# Patient Record
Sex: FEMALE | Race: WHITE | NOT HISPANIC OR LATINO | Employment: OTHER | URBAN - METROPOLITAN AREA
[De-identification: names, ages, dates, MRNs, and addresses within clinical notes are randomized per-mention and may not be internally consistent; named-entity substitution may affect disease eponyms.]

---

## 2017-02-28 ENCOUNTER — OFFICE VISIT - HEALTHEAST (OUTPATIENT)
Dept: FAMILY MEDICINE | Facility: CLINIC | Age: 57
End: 2017-02-28

## 2017-02-28 ENCOUNTER — COMMUNICATION - HEALTHEAST (OUTPATIENT)
Dept: TELEHEALTH | Facility: CLINIC | Age: 57
End: 2017-02-28

## 2017-02-28 DIAGNOSIS — Z78.0 MENOPAUSE: ICD-10-CM

## 2017-02-28 DIAGNOSIS — Z13.228 SCREENING FOR ENDOCRINE, NUTRITIONAL, METABOLIC AND IMMUNITY DISORDER: ICD-10-CM

## 2017-02-28 DIAGNOSIS — Z00.00 PREVENTATIVE HEALTH CARE: ICD-10-CM

## 2017-02-28 DIAGNOSIS — Z13.0 SCREENING, ANEMIA, DEFICIENCY, IRON: ICD-10-CM

## 2017-02-28 DIAGNOSIS — Z13.29 SCREENING FOR THYROID DISORDER: ICD-10-CM

## 2017-02-28 DIAGNOSIS — Z12.11 SCREEN FOR COLON CANCER: ICD-10-CM

## 2017-02-28 DIAGNOSIS — Z13.0 SCREENING FOR ENDOCRINE, NUTRITIONAL, METABOLIC AND IMMUNITY DISORDER: ICD-10-CM

## 2017-02-28 DIAGNOSIS — Z13.29 SCREENING FOR ENDOCRINE, NUTRITIONAL, METABOLIC AND IMMUNITY DISORDER: ICD-10-CM

## 2017-02-28 DIAGNOSIS — Z12.4 SCREENING FOR MALIGNANT NEOPLASM OF CERVIX: ICD-10-CM

## 2017-02-28 DIAGNOSIS — Z13.220 SCREENING, LIPID: ICD-10-CM

## 2017-02-28 DIAGNOSIS — Z13.21 SCREENING FOR ENDOCRINE, NUTRITIONAL, METABOLIC AND IMMUNITY DISORDER: ICD-10-CM

## 2017-02-28 DIAGNOSIS — M81.0 OSTEOPOROSIS: ICD-10-CM

## 2017-02-28 DIAGNOSIS — Z00.00 ROUTINE GENERAL MEDICAL EXAMINATION AT A HEALTH CARE FACILITY: ICD-10-CM

## 2017-02-28 DIAGNOSIS — Z12.31 ENCOUNTER FOR SCREENING MAMMOGRAM FOR MALIGNANT NEOPLASM OF BREAST: ICD-10-CM

## 2017-02-28 DIAGNOSIS — E55.9 VITAMIN D DEFICIENCY DISEASE: ICD-10-CM

## 2017-02-28 DIAGNOSIS — Z13.228 SCREENING FOR METABOLIC DISORDER: ICD-10-CM

## 2017-02-28 DIAGNOSIS — Z13.1 SCREENING FOR DIABETES MELLITUS: ICD-10-CM

## 2017-02-28 ASSESSMENT — MIFFLIN-ST. JEOR: SCORE: 1205.24

## 2017-02-28 NOTE — ASSESSMENT & PLAN NOTE
Vitamin d level is low. It is 28.3 but I would like to see it closer to 60.  I recommend you start taking (or increase current intake of) over the counter vitamin D3, 2000 IU daily.  We can recheck your vitamin D level in 3 months or at your next visit.

## 2017-02-28 NOTE — ASSESSMENT & PLAN NOTE
New diagnosis 3/22/17  My Message was sent today asking her to come in to discuss this and make a plan.

## 2017-02-28 NOTE — ASSESSMENT & PLAN NOTE
Pap: normal 2010  Mammo: never done. Needed.   Colonoscopy: never done, due for screening. (addendum 4/3/17: this was done 3/2017 and a hyperplastic rectal polyp was removed - follow up recommended in 10 years).   Std testing desired:  offered  Osteoporosis prevention discussed.  vitamin d levels ordered. Recommend daily calcium and vitamin d intake to keep good bone health. Recommend weight bearing exercise, no tobacco, and limit alcohol  dexa -will order as she is in menopause.   Recommend sunscreen, exercise, & healthy diet.  Offered tsh, glucose, hgb, lipid  I have had an Advance Directives discussion with the patient.   Body mass index is 20.06 kg/(m^2).   mychart offered.

## 2017-03-03 ENCOUNTER — AMBULATORY - HEALTHEAST (OUTPATIENT)
Dept: LAB | Facility: CLINIC | Age: 57
End: 2017-03-03

## 2017-03-03 DIAGNOSIS — Z13.0 SCREENING FOR ENDOCRINE, NUTRITIONAL, METABOLIC AND IMMUNITY DISORDER: ICD-10-CM

## 2017-03-03 DIAGNOSIS — Z13.220 SCREENING, LIPID: ICD-10-CM

## 2017-03-03 DIAGNOSIS — Z13.29 SCREENING FOR THYROID DISORDER: ICD-10-CM

## 2017-03-03 DIAGNOSIS — Z13.1 SCREENING FOR DIABETES MELLITUS: ICD-10-CM

## 2017-03-03 DIAGNOSIS — Z13.228 SCREENING FOR ENDOCRINE, NUTRITIONAL, METABOLIC AND IMMUNITY DISORDER: ICD-10-CM

## 2017-03-03 DIAGNOSIS — Z13.21 SCREENING FOR ENDOCRINE, NUTRITIONAL, METABOLIC AND IMMUNITY DISORDER: ICD-10-CM

## 2017-03-03 DIAGNOSIS — Z13.0 SCREENING, ANEMIA, DEFICIENCY, IRON: ICD-10-CM

## 2017-03-03 DIAGNOSIS — Z13.228 SCREENING FOR METABOLIC DISORDER: ICD-10-CM

## 2017-03-03 DIAGNOSIS — Z13.29 SCREENING FOR ENDOCRINE, NUTRITIONAL, METABOLIC AND IMMUNITY DISORDER: ICD-10-CM

## 2017-03-03 LAB
CHOLEST SERPL-MCNC: 186 MG/DL
FASTING STATUS PATIENT QL REPORTED: YES
HDLC SERPL-MCNC: 83 MG/DL
LDLC SERPL CALC-MCNC: 92 MG/DL
TRIGL SERPL-MCNC: 55 MG/DL

## 2017-03-06 LAB
HPV INTERPRETATION - HISTORICAL: NORMAL
HPV INTERPRETER - HISTORICAL: NORMAL

## 2017-03-07 LAB
BKR LAB AP ABNORMAL BLEEDING: NO
BKR LAB AP BIRTH CONTROL/HORMONES: NORMAL
BKR LAB AP CERVICAL APPEARANCE: NORMAL
BKR LAB AP GYN ADEQUACY: NORMAL
BKR LAB AP GYN INTERPRETATION: NORMAL
BKR LAB AP HPV REFLEX: NORMAL
BKR LAB AP LMP: NORMAL
BKR LAB AP PATIENT STATUS: NORMAL
BKR LAB AP PREVIOUS ABNORMAL: NORMAL
BKR LAB AP PREVIOUS NORMAL: 2010
HIGH RISK?: NO
PATH REPORT.COMMENTS IMP SPEC: NORMAL
RESULT FLAG (HE HISTORICAL CONVERSION): NORMAL

## 2017-03-13 ENCOUNTER — RECORDS - HEALTHEAST (OUTPATIENT)
Dept: BONE DENSITY | Facility: CLINIC | Age: 57
End: 2017-03-13

## 2017-03-13 ENCOUNTER — RECORDS - HEALTHEAST (OUTPATIENT)
Dept: ADMINISTRATIVE | Facility: OTHER | Age: 57
End: 2017-03-13

## 2017-03-13 ENCOUNTER — HOSPITAL ENCOUNTER (OUTPATIENT)
Dept: MAMMOGRAPHY | Facility: HOSPITAL | Age: 57
Discharge: HOME | End: 2017-03-13
Attending: FAMILY MEDICINE

## 2017-03-13 DIAGNOSIS — Z12.31 ENCOUNTER FOR SCREENING MAMMOGRAM FOR MALIGNANT NEOPLASM OF BREAST: ICD-10-CM

## 2017-03-13 DIAGNOSIS — Z78.0 ASYMPTOMATIC MENOPAUSAL STATE: ICD-10-CM

## 2017-03-21 ENCOUNTER — RECORDS - HEALTHEAST (OUTPATIENT)
Dept: ADMINISTRATIVE | Facility: OTHER | Age: 57
End: 2017-03-21

## 2017-04-05 ENCOUNTER — OFFICE VISIT - HEALTHEAST (OUTPATIENT)
Dept: FAMILY MEDICINE | Facility: CLINIC | Age: 57
End: 2017-04-05

## 2017-04-05 DIAGNOSIS — E55.9 VITAMIN D DEFICIENCY DISEASE: ICD-10-CM

## 2017-04-05 DIAGNOSIS — M81.0 OSTEOPOROSIS: ICD-10-CM

## 2017-04-05 ASSESSMENT — MIFFLIN-ST. JEOR: SCORE: 1205.24

## 2017-04-05 NOTE — ASSESSMENT & PLAN NOTE
dexa 8/2015 shows osteoporosis.  Recommend STOP SMOKING, weight bearing exercise, alendronate 70mg po q week, vit D 2000 IU, calcium, weight bearing exercise, and repeat dexa in 2 years.

## 2018-02-27 ENCOUNTER — COMMUNICATION - HEALTHEAST (OUTPATIENT)
Dept: FAMILY MEDICINE | Facility: CLINIC | Age: 58
End: 2018-02-27

## 2018-02-27 DIAGNOSIS — M81.0 OSTEOPOROSIS: ICD-10-CM

## 2018-04-11 ENCOUNTER — AMBULATORY - HEALTHEAST (OUTPATIENT)
Dept: SCHEDULING | Facility: CLINIC | Age: 58
End: 2018-04-11

## 2018-04-11 ENCOUNTER — OFFICE VISIT - HEALTHEAST (OUTPATIENT)
Dept: FAMILY MEDICINE | Facility: CLINIC | Age: 58
End: 2018-04-11

## 2018-04-11 DIAGNOSIS — J30.1 HAYFEVER: ICD-10-CM

## 2018-04-11 DIAGNOSIS — Z13.0 SCREENING, ANEMIA, DEFICIENCY, IRON: ICD-10-CM

## 2018-04-11 DIAGNOSIS — M81.0 OSTEOPOROSIS: ICD-10-CM

## 2018-04-11 DIAGNOSIS — E55.9 VITAMIN D DEFICIENCY DISEASE: ICD-10-CM

## 2018-04-11 DIAGNOSIS — M67.472 GANGLION CYST OF LEFT FOOT: ICD-10-CM

## 2018-04-11 DIAGNOSIS — Z78.0 MENOPAUSE: ICD-10-CM

## 2018-04-11 DIAGNOSIS — Z13.228 ENCOUNTER FOR SCREENING FOR METABOLIC DISORDER: ICD-10-CM

## 2018-04-11 DIAGNOSIS — Z13.29 SCREENING FOR THYROID DISORDER: ICD-10-CM

## 2018-04-11 DIAGNOSIS — Z13.220 SCREENING, LIPID: ICD-10-CM

## 2018-04-11 DIAGNOSIS — Z00.00 PREVENTATIVE HEALTH CARE: ICD-10-CM

## 2018-04-11 LAB
ALBUMIN SERPL-MCNC: 4 G/DL (ref 3.5–5)
ALP SERPL-CCNC: 50 U/L (ref 45–120)
ALT SERPL W P-5'-P-CCNC: 18 U/L (ref 0–45)
ANION GAP SERPL CALCULATED.3IONS-SCNC: 9 MMOL/L (ref 5–18)
AST SERPL W P-5'-P-CCNC: 26 U/L (ref 0–40)
BASOPHILS # BLD AUTO: 0 THOU/UL (ref 0–0.2)
BASOPHILS NFR BLD AUTO: 1 % (ref 0–2)
BILIRUB SERPL-MCNC: 0.7 MG/DL (ref 0–1)
BUN SERPL-MCNC: 19 MG/DL (ref 8–22)
CALCIUM SERPL-MCNC: 10.2 MG/DL (ref 8.5–10.5)
CHLORIDE BLD-SCNC: 104 MMOL/L (ref 98–107)
CO2 SERPL-SCNC: 27 MMOL/L (ref 22–31)
CREAT SERPL-MCNC: 0.81 MG/DL (ref 0.6–1.1)
EOSINOPHIL # BLD AUTO: 0.3 THOU/UL (ref 0–0.4)
EOSINOPHIL NFR BLD AUTO: 4 % (ref 0–6)
ERYTHROCYTE [DISTWIDTH] IN BLOOD BY AUTOMATED COUNT: 10.4 % (ref 11–14.5)
GFR SERPL CREATININE-BSD FRML MDRD: >60 ML/MIN/1.73M2
GLUCOSE BLD-MCNC: 85 MG/DL (ref 70–125)
HCT VFR BLD AUTO: 41.3 % (ref 35–47)
HGB BLD-MCNC: 13.9 G/DL (ref 12–16)
LYMPHOCYTES # BLD AUTO: 1.4 THOU/UL (ref 0.8–4.4)
LYMPHOCYTES NFR BLD AUTO: 21 % (ref 20–40)
MCH RBC QN AUTO: 32.9 PG (ref 27–34)
MCHC RBC AUTO-ENTMCNC: 33.7 G/DL (ref 32–36)
MCV RBC AUTO: 98 FL (ref 80–100)
MONOCYTES # BLD AUTO: 0.5 THOU/UL (ref 0–0.9)
MONOCYTES NFR BLD AUTO: 8 % (ref 2–10)
NEUTROPHILS # BLD AUTO: 4.5 THOU/UL (ref 2–7.7)
NEUTROPHILS NFR BLD AUTO: 66 % (ref 50–70)
PLATELET # BLD AUTO: 230 THOU/UL (ref 140–440)
PMV BLD AUTO: 7.4 FL (ref 7–10)
POTASSIUM BLD-SCNC: 4.6 MMOL/L (ref 3.5–5)
PROT SERPL-MCNC: 7.4 G/DL (ref 6–8)
RBC # BLD AUTO: 4.23 MILL/UL (ref 3.8–5.4)
SODIUM SERPL-SCNC: 140 MMOL/L (ref 136–145)
TSH SERPL DL<=0.005 MIU/L-ACNC: 0.75 UIU/ML (ref 0.3–5)
WBC: 6.8 THOU/UL (ref 4–11)

## 2018-04-11 ASSESSMENT — MIFFLIN-ST. JEOR: SCORE: 1244.93

## 2018-04-11 NOTE — ASSESSMENT & PLAN NOTE
Taking 2000 - 3000 IU daily and dietary vit D, started over the past year after last blood draw. Vit D 3/2017  Recheck now.

## 2018-04-11 NOTE — ASSESSMENT & PLAN NOTE
Pap: normal 2/2017 - repeat - 2022.   Mammo: normal 3/2017. She is offered 1-2 year follow ups. She wants to wait until next year for next mammogram.  Colonoscopy:done 3/2017 and a hyperplastic rectal polyp was removed - follow up recommended in 10 years  Std testing desired:  offered   Recommend sunscreen, exercise, & healthy diet.  Offered tsh, glucose, hgb, lipid  I have had an Advance Directives discussion with the patient.   Body mass index is 20.06 kg/(m^2).   STAT-Diagnosticat active

## 2018-04-11 NOTE — ASSESSMENT & PLAN NOTE
Will repeat this year. Has been taking alendronate 70 mg po q week.  Will refill.  She tells me she does weightbearing exercise, she does not drink alcohol except for extremely rarely, she does not smoke, she gets dietary as well as supplemental vitamin D and she also is intentional about calcium intake.  In addition she has taken steps to stop drinking any carbonated beverages and avoids coffee.

## 2018-04-12 LAB
25(OH)D3 SERPL-MCNC: 61.6 NG/ML (ref 30–80)
25(OH)D3 SERPL-MCNC: 61.6 NG/ML (ref 30–80)

## 2018-04-17 ENCOUNTER — AMBULATORY - HEALTHEAST (OUTPATIENT)
Dept: LAB | Facility: CLINIC | Age: 58
End: 2018-04-17

## 2018-04-17 DIAGNOSIS — Z13.220 SCREENING, LIPID: ICD-10-CM

## 2018-04-17 LAB
CHOLEST SERPL-MCNC: 197 MG/DL
FASTING STATUS PATIENT QL REPORTED: YES
HDLC SERPL-MCNC: 84 MG/DL
LDLC SERPL CALC-MCNC: 103 MG/DL
TRIGL SERPL-MCNC: 49 MG/DL

## 2018-08-10 ENCOUNTER — COMMUNICATION - HEALTHEAST (OUTPATIENT)
Dept: FAMILY MEDICINE | Facility: CLINIC | Age: 58
End: 2018-08-10

## 2018-08-10 DIAGNOSIS — M81.0 OSTEOPOROSIS: ICD-10-CM

## 2018-11-02 ENCOUNTER — COMMUNICATION - HEALTHEAST (OUTPATIENT)
Dept: FAMILY MEDICINE | Facility: CLINIC | Age: 58
End: 2018-11-02

## 2018-11-02 DIAGNOSIS — M81.0 OSTEOPOROSIS: ICD-10-CM

## 2019-01-25 ENCOUNTER — COMMUNICATION - HEALTHEAST (OUTPATIENT)
Dept: FAMILY MEDICINE | Facility: CLINIC | Age: 59
End: 2019-01-25

## 2019-01-25 DIAGNOSIS — M81.0 OSTEOPOROSIS: ICD-10-CM

## 2019-01-29 ENCOUNTER — COMMUNICATION - HEALTHEAST (OUTPATIENT)
Dept: FAMILY MEDICINE | Facility: CLINIC | Age: 59
End: 2019-01-29

## 2019-01-29 DIAGNOSIS — M81.0 OSTEOPOROSIS: ICD-10-CM

## 2019-01-30 ENCOUNTER — COMMUNICATION - HEALTHEAST (OUTPATIENT)
Dept: FAMILY MEDICINE | Facility: CLINIC | Age: 59
End: 2019-01-30

## 2019-01-30 DIAGNOSIS — M81.0 OSTEOPOROSIS: ICD-10-CM

## 2020-12-08 ENCOUNTER — OFFICE VISIT - HEALTHEAST (OUTPATIENT)
Dept: FAMILY MEDICINE | Facility: CLINIC | Age: 60
End: 2020-12-08

## 2020-12-08 DIAGNOSIS — E55.9 VITAMIN D DEFICIENCY DISEASE: ICD-10-CM

## 2020-12-08 DIAGNOSIS — M67.472 GANGLION CYST OF LEFT FOOT: ICD-10-CM

## 2020-12-08 DIAGNOSIS — Z11.4 SCREENING FOR HIV WITHOUT PRESENCE OF RISK FACTORS: ICD-10-CM

## 2020-12-08 DIAGNOSIS — Z13.220 SCREENING, LIPID: ICD-10-CM

## 2020-12-08 DIAGNOSIS — Z00.00 PREVENTATIVE HEALTH CARE: ICD-10-CM

## 2020-12-08 DIAGNOSIS — Z13.0 SCREENING, ANEMIA, DEFICIENCY, IRON: ICD-10-CM

## 2020-12-08 DIAGNOSIS — Z13.228 SCREENING FOR METABOLIC DISORDER: ICD-10-CM

## 2020-12-08 DIAGNOSIS — J30.1 HAYFEVER: ICD-10-CM

## 2020-12-08 DIAGNOSIS — M81.0 AGE RELATED OSTEOPOROSIS, UNSPECIFIED PATHOLOGICAL FRACTURE PRESENCE: ICD-10-CM

## 2020-12-08 DIAGNOSIS — Z11.59 ENCOUNTER FOR HEPATITIS C SCREENING TEST FOR LOW RISK PATIENT: ICD-10-CM

## 2020-12-08 DIAGNOSIS — Z23 IMMUNIZATION DUE: ICD-10-CM

## 2020-12-08 DIAGNOSIS — Z12.31 ENCOUNTER FOR SCREENING MAMMOGRAM FOR MALIGNANT NEOPLASM OF BREAST: ICD-10-CM

## 2020-12-08 LAB
ALBUMIN SERPL-MCNC: 4.5 G/DL (ref 3.5–5)
ALP SERPL-CCNC: 64 U/L (ref 45–120)
ALT SERPL W P-5'-P-CCNC: 18 U/L (ref 0–45)
ANION GAP SERPL CALCULATED.3IONS-SCNC: 10 MMOL/L (ref 5–18)
AST SERPL W P-5'-P-CCNC: 27 U/L (ref 0–40)
BASOPHILS # BLD AUTO: 0 THOU/UL (ref 0–0.2)
BASOPHILS NFR BLD AUTO: 0 % (ref 0–2)
BILIRUB SERPL-MCNC: 0.5 MG/DL (ref 0–1)
BUN SERPL-MCNC: 16 MG/DL (ref 8–22)
CALCIUM SERPL-MCNC: 9.8 MG/DL (ref 8.5–10.5)
CHLORIDE BLD-SCNC: 102 MMOL/L (ref 98–107)
CO2 SERPL-SCNC: 28 MMOL/L (ref 22–31)
CREAT SERPL-MCNC: 0.81 MG/DL (ref 0.6–1.1)
EOSINOPHIL # BLD AUTO: 0.3 THOU/UL (ref 0–0.4)
EOSINOPHIL NFR BLD AUTO: 5 % (ref 0–6)
ERYTHROCYTE [DISTWIDTH] IN BLOOD BY AUTOMATED COUNT: 10.5 % (ref 11–14.5)
GFR SERPL CREATININE-BSD FRML MDRD: >60 ML/MIN/1.73M2
GLUCOSE BLD-MCNC: 95 MG/DL (ref 70–125)
HCT VFR BLD AUTO: 41 % (ref 35–47)
HGB BLD-MCNC: 14.1 G/DL (ref 12–16)
LYMPHOCYTES # BLD AUTO: 1.5 THOU/UL (ref 0.8–4.4)
LYMPHOCYTES NFR BLD AUTO: 20 % (ref 20–40)
MCH RBC QN AUTO: 33.7 PG (ref 27–34)
MCHC RBC AUTO-ENTMCNC: 34.5 G/DL (ref 32–36)
MCV RBC AUTO: 98 FL (ref 80–100)
MONOCYTES # BLD AUTO: 0.5 THOU/UL (ref 0–0.9)
MONOCYTES NFR BLD AUTO: 7 % (ref 2–10)
NEUTROPHILS # BLD AUTO: 5 THOU/UL (ref 2–7.7)
NEUTROPHILS NFR BLD AUTO: 68 % (ref 50–70)
PLATELET # BLD AUTO: 237 THOU/UL (ref 140–440)
PMV BLD AUTO: 7.7 FL (ref 7–10)
POTASSIUM BLD-SCNC: 4.2 MMOL/L (ref 3.5–5)
PROT SERPL-MCNC: 7.5 G/DL (ref 6–8)
RBC # BLD AUTO: 4.2 MILL/UL (ref 3.8–5.4)
SODIUM SERPL-SCNC: 140 MMOL/L (ref 136–145)
WBC: 7.4 THOU/UL (ref 4–11)

## 2020-12-08 ASSESSMENT — MIFFLIN-ST. JEOR: SCORE: 1218.38

## 2020-12-08 NOTE — ASSESSMENT & PLAN NOTE
Flu shot done.   Pap: normal 2/2017 due in 5 years   Mammo: normal 2/2017, repeat ordered.   Colonoscopy:  Colonoscopy 3/2017 plan repeat 10 years.   Std testing desired: declined.  offered  Osteoporosis prevention discussed.  vitamin d levels ordered. Recommend daily calcium and vitamin d intake to keep good bone health. Recommend weight bearing exercise, no tobacco, and limit alcohol  dexa - see osteoporosis in problem list.   Recommend sunscreen, exercise, & healthy diet.  Offered cbc, cmp, lipids and asked what other testing she  desires today  I have had an Advance Directives discussion with the patient. - she does have one, and is asked to get us a copy.   Body mass index is 20.68 kg/m .   mychart active.

## 2020-12-08 NOTE — ASSESSMENT & PLAN NOTE
She says she got sick to the stomach from fosamax so 'I just stopped taking it'.     We discussed prolia today and she declined that.

## 2020-12-08 NOTE — ASSESSMENT & PLAN NOTE
Vitamin D, Total (25-Hydroxy)   Date Value Ref Range Status   04/11/2018 61.6 30.0 - 80.0 ng/mL Final      Will recheck.

## 2020-12-09 ENCOUNTER — AMBULATORY - HEALTHEAST (OUTPATIENT)
Dept: LAB | Facility: CLINIC | Age: 60
End: 2020-12-09

## 2020-12-09 DIAGNOSIS — Z13.220 SCREENING, LIPID: ICD-10-CM

## 2020-12-09 LAB
25(OH)D3 SERPL-MCNC: 59.6 NG/ML (ref 30–80)
CHOLEST SERPL-MCNC: 197 MG/DL
FASTING STATUS PATIENT QL REPORTED: YES
HDLC SERPL-MCNC: 74 MG/DL
LDLC SERPL CALC-MCNC: 110 MG/DL
TRIGL SERPL-MCNC: 66 MG/DL

## 2021-01-04 ENCOUNTER — HOSPITAL ENCOUNTER (OUTPATIENT)
Dept: MAMMOGRAPHY | Facility: CLINIC | Age: 61
Discharge: HOME | End: 2021-01-04
Attending: FAMILY MEDICINE

## 2021-01-04 DIAGNOSIS — Z12.31 ENCOUNTER FOR SCREENING MAMMOGRAM FOR MALIGNANT NEOPLASM OF BREAST: ICD-10-CM

## 2021-05-30 VITALS — WEIGHT: 130 LBS | BODY MASS INDEX: 19.7 KG/M2 | HEIGHT: 68 IN

## 2021-06-01 VITALS — WEIGHT: 137 LBS | BODY MASS INDEX: 20.76 KG/M2 | HEIGHT: 68 IN

## 2021-06-05 VITALS
DIASTOLIC BLOOD PRESSURE: 64 MMHG | HEIGHT: 68 IN | OXYGEN SATURATION: 98 % | BODY MASS INDEX: 20.31 KG/M2 | HEART RATE: 75 BPM | WEIGHT: 134 LBS | SYSTOLIC BLOOD PRESSURE: 112 MMHG

## 2021-06-09 NOTE — PROGRESS NOTES
ASSESSMENT AND PLAN:  Problem List Items Addressed This Visit     Vitamin D deficiency disease     She is actively taking vit D 2000 iu daily.         Osteoporosis - Primary     dexa 2015 shows osteoporosis.  Recommend STOP SMOKING, weight bearing exercise, alendronate 70mg po q week, vit D 2000 IU, calcium, weight bearing exercise, and repeat dexa in 2 years.         Relevant Medications    alendronate (FOSAMAX) 70 MG tablet           Chief Complaint   Patient presents with     Results     DEXA scan results - wants to discuss options for treatment.       HPI  Angela Dailey is a 56 y.o. female comes in today to discuss a new diagnosis of osteoporosis.  She tells me that her mother had osteoporosis and that her brought mother actually broke both of her hips in her 80s.  Her mother was able to rehabilitate and walk with a cane.  Her mother ended up living independently until she was 93 and  in a car accident.    Still, Angela  remembers what it was like to see her mother deal with the pain from osteoporosis and is not at all interested in dealing with it herself.  She is extremely motivated and tells me that she walks daily and does weight lifting 3 times a week for 10-20 minutes.  She has been taking vitamin D about 2000 international units a day including dietary sources since she had her labs drawn and saw that she was vitamin D deficient.      She drinks alcohol about 3 times a week and does not smoke at all.    History   Smoking Status     Never Smoker   Smokeless Tobacco     Never Used      No current outpatient prescriptions on file.     No current facility-administered medications for this visit.      No Known Allergies    Review of Systems   Constitutional: Negative.    HENT: Negative.    Eyes: Negative.    Respiratory: Negative.    Cardiovascular: Negative.    Gastrointestinal: Negative.    Endocrine: Negative.    Genitourinary: Negative.    Musculoskeletal: Negative.    Skin: Negative.   "  Neurological: Negative.    Hematological: Negative.    Psychiatric/Behavioral: Negative.        OBJECTIVE: BP 92/60  Pulse 76  Resp 14  Ht 5' 7.5\" (1.715 m)  Wt 130 lb (59 kg)  LMP  (LMP Unknown)  Breastfeeding? No  BMI 20.06 kg/m2  Physical Exam   Constitutional: She is oriented to person, place, and time. She appears well-developed and well-nourished.   HENT:   Head: Normocephalic and atraumatic.   Eyes: Conjunctivae are normal.   Cardiovascular: Normal rate and regular rhythm.    Pulmonary/Chest: Effort normal.   Neurological: She is alert and oriented to person, place, and time.   Skin: Skin is warm and dry.   Psychiatric: She has a normal mood and affect.      "

## 2021-06-09 NOTE — PROGRESS NOTES
Problem List Items Addressed This Visit     Preventative health care     Pap: normal 2010  Mammo: never done. Needed.   Colonoscopy: never done, due for screening.    Std testing desired:  offered  Osteoporosis prevention discussed.  vitamin d levels ordered. Recommend daily calcium and vitamin d intake to keep good bone health. Recommend weight bearing exercise, no tobacco, and limit alcohol  dexa - will order as she is in menopause.   Recommend sunscreen, exercise, & healthy diet.  Offered tsh, glucose, hgb, lipid  I have had an Advance Directives discussion with the patient.   Body mass index is 20.06 kg/(m^2).   mychart offered.           Menopause     dexa ordered.          Relevant Orders    DXA Bone Density Scan    Vitamin D deficiency disease     Vitamin d level is low. It is 28.3 but I would like to see it closer to 60.  I recommend you start taking (or increase current intake of) over the counter vitamin D3, 2000 IU daily.  We can recheck your vitamin D level in 3 months or at your next visit.                 Other Visit Diagnoses     Routine general medical examination at a health care facility    -  Primary    Screening, lipid        Relevant Orders    Lipid Cascade (Completed)    Screening for diabetes mellitus        Relevant Orders    Comprehensive Metabolic Panel (Completed)    Screening for metabolic disorder        Relevant Orders    Comprehensive Metabolic Panel (Completed)    Screening for thyroid disorder        Relevant Orders    Thyroid Cascade (Completed)    Screening, anemia, deficiency, iron        Relevant Orders    HM1(CBC and Differential) (Completed)    Screening for endocrine, nutritional, metabolic and immunity disorder        Relevant Orders    Vitamin D, Total (25-Hydroxy) (Completed)    Screen for colon cancer        Relevant Orders    Ambulatory referral for Colonoscopy    Encounter for screening mammogram for malignant neoplasm of breast        Relevant Orders    Mammo Screening  Bilateral    Screening for malignant neoplasm of cervix        Relevant Orders    Gynecologic Cytology (PAP Smear) (Completed)    HPV Cascade (PCR) (Completed)

## 2021-06-16 PROBLEM — K62.1 RECTAL POLYP: Status: ACTIVE | Noted: 2017-03-21

## 2021-06-23 NOTE — TELEPHONE ENCOUNTER
Refill Approved    Rx renewed per Medication Renewal Policy. Medication was last renewed on 11/2/18.    Kimberly Young, [facility] Triage/Med Refill 1/27/2019     Requested Prescriptions   Pending Prescriptions Disp Refills     alendronate (FOSAMAX) 70 MG tablet [Pharmacy Med Name: ALENDRONATE SODIUM 70 MG TAB] 12 tablet 0     Sig: TAKE 1 TAB BY MOUTH EVERY 7 DAYS. TAKE IN THE AM WITH WATER ON EMPTY STOMACH, 30 MIN BEFORE FOOD    Biphosphonates Refill Protocol Passed - 1/25/2019  1:41 AM       Passed - PCP or prescribing provider visit in last 12 months    Last office visit with prescriber/PCP: 4/5/2017 Brooklyn Ryder MD OR same dept: Visit date not found OR same specialty: 4/5/2017 Brooklyn Ryder MD  Last physical: 4/11/2018 Last MTM visit: Visit date not found   Next visit within 3 mo: Visit date not found  Next physical within 3 mo: Visit date not found  Prescriber OR PCP: Brooklyn Ryder MD  Last diagnosis associated with med order: 1. Osteoporosis  - alendronate (FOSAMAX) 70 MG tablet [Pharmacy Med Name: ALENDRONATE SODIUM 70 MG TAB]; TAKE 1 TAB BY MOUTH EVERY 7 DAYS, TAKE IN THE AM WITH WATER ON EMPTY STOMACH, 30 MIN BEFORE FOOD  Dispense: 12 tablet; Refill: 0    If protocol passes may refill for 12 months if within 3 months of last provider visit (or a total of 15 months).            Passed - Serum creatinine in last 12 months    Creatinine   Date Value Ref Range Status   04/11/2018 0.81 0.60 - 1.10 mg/dL Final

## 2021-06-23 NOTE — TELEPHONE ENCOUNTER
Refill Approved    Rx renewed per Medication Renewal Policy. Medication was last renewed on 1/27/19.    Ov: 4/11/18    Yamilex Ellis, [facility] Triage/Med Refill 1/31/2019     Requested Prescriptions   Pending Prescriptions Disp Refills     alendronate (FOSAMAX) 70 MG tablet [Pharmacy Med Name: ALENDRONATE SODIUM 70 MG TAB] 12 tablet 0     Sig: TAKE 1 TAB BY MOUTH EVERY 7 DAYS. TAKE IN THE AM WITH WATER ON EMPTY STOMACH, 30 MIN BEFORE FOOD    Biphosphonates Refill Protocol Passed - 1/30/2019  6:43 PM       Passed - PCP or prescribing provider visit in last 12 months    Last office visit with prescriber/PCP: 4/5/2017 Brooklyn Ryder MD OR same dept: Visit date not found OR same specialty: 4/5/2017 Brooklyn Ryder MD  Last physical: 4/11/2018 Last MTM visit: Visit date not found   Next visit within 3 mo: Visit date not found  Next physical within 3 mo: Visit date not found  Prescriber OR PCP: Brooklyn Ryder MD  Last diagnosis associated with med order: 1. Osteoporosis  - alendronate (FOSAMAX) 70 MG tablet [Pharmacy Med Name: ALENDRONATE SODIUM 70 MG TAB]; TAKE 1 TAB BY MOUTH EVERY 7 DAYS. TAKE IN THE AM WITH WATER ON EMPTY STOMACH, 30 MIN BEFORE FOOD  Dispense: 12 tablet; Refill: 0    If protocol passes may refill for 12 months if within 3 months of last provider visit (or a total of 15 months).            Passed - Serum creatinine in last 12 months    Creatinine   Date Value Ref Range Status   04/11/2018 0.81 0.60 - 1.10 mg/dL Final

## 2021-06-25 NOTE — PROGRESS NOTES
Progress Notes by Brooklyn Ryder MD at 2/28/2017  3:20 PM     Author: Brooklyn Ryder MD Service: -- Author Type: Physician    Filed: 2/28/2017  6:11 PM Encounter Date: 2/28/2017 Status: Signed    : Brooklyn Ryder MD (Physician)       Assessment:      Healthy female exam.      Plan:     Problem List Items Addressed This Visit     Preventative health care     Pap: normal 2010  Mammo: never done. Needed.   Colonoscopy: never done, due for screening.    Std testing desired:  offered  Osteoporosis prevention discussed.  vitamin d levels ordered. Recommend daily calcium and vitamin d intake to keep good bone health. Recommend weight bearing exercise, no tobacco, and limit alcohol  dexa - will order as she is in menopause.   Recommend sunscreen, exercise, & healthy diet.  Offered tsh, glucose, hgb, lipid  I have had an Advance Directives discussion with the patient.   Body mass index is 20.06 kg/(m^2).   mychart offered.           Menopause     dexa ordered.          Relevant Orders    DXA Bone Density Scan      Other Visit Diagnoses     Routine general medical examination at a health care facility    -  Primary    Screening, lipid        Relevant Orders    Lipid Cascade    Screening for diabetes mellitus        Relevant Orders    Comprehensive Metabolic Panel    Screening for metabolic disorder        Relevant Orders    Comprehensive Metabolic Panel    Screening for thyroid disorder        Relevant Orders    Thyroid Cascade    Screening, anemia, deficiency, iron        Relevant Orders    HM1(CBC and Differential)    Screening for endocrine, nutritional, metabolic and immunity disorder        Relevant Orders    Vitamin D, Total (25-Hydroxy)    Screen for colon cancer        Relevant Orders    Ambulatory referral for Colonoscopy    Encounter for screening mammogram for malignant neoplasm of breast        Relevant Orders    Mammo Screening Bilateral           I have had an Advance Directives discussion  with the patient.      Subjective:      Angela Dailey is a 56 y.o. female who presents for an annual exam. The patient is sexually active. The patient participates in regular exercise: yes. The patient reports that there is not domestic violence in her life.     Healthy Habits:   Regular Exercise: Yes  Sunscreen Use: Yes  Healthy Diet: Yes  Dental Visits Regularly: Yes  Seat Belt: Yes  Sexually active: Yes  Self Breast Exam Monthly:Yes  Hemoccults: No  Flex Sig: No  Colonoscopy: No  Lipid Profile: Yes  Glucose Screen: Yes  Prevention of Osteoporosis: Yes  Last Dexa: No  Guns at Home:  No        There is no immunization history on file for this patient.  Immunization status: Unknown.    No exam data present    Gynecologic History  No LMP recorded (lmp unknown). Patient is postmenopausal.  Contraception: menopause.   Last Pap: . Results were: normal  Last mammogram: None. Results were: None      OB History    Para Term  AB SAB TAB Ectopic Multiple Living   4 2 2  2 2          # Outcome Date GA Lbr Rohit/2nd Weight Sex Delivery Anes PTL Lv   4 Term 99    F       3 Term 95    F       2 SAB            1 SAB                   No current outpatient prescriptions on file.     No current facility-administered medications for this visit.      No past medical history on file.  No past surgical history on file.  Review of patient's allergies indicates no known allergies.  No family history on file.  Social History     Social History   ? Marital status:      Spouse name: N/A   ? Number of children: N/A   ? Years of education: N/A     Occupational History   ? Not on file.     Social History Main Topics   ? Smoking status: Never Smoker   ? Smokeless tobacco: Never Used   ? Alcohol use Yes   ? Drug use: No   ? Sexual activity: Yes     Partners: Male     Birth control/ protection: Post-menopausal     Other Topics Concern   ? Not on file     Social History Narrative   ? No narrative on file  "      Review of Systems   Review of Systems   Constitutional: Negative.    HENT: Negative.    Eyes: Negative.    Respiratory: Negative.    Cardiovascular: Negative.    Gastrointestinal: Negative.    Endocrine: Negative.    Genitourinary: Negative.    Musculoskeletal: Negative.    Skin: Negative.    Neurological: Negative.    Hematological: Negative.    Psychiatric/Behavioral: Negative.              Objective:         Vitals:    02/28/17 1526   BP: 108/72   Pulse: 80   Resp: 16   Weight: 130 lb (59 kg)   Height: 5' 7.5\" (1.715 m)     Body mass index is 20.06 kg/(m^2).    Physical   Physical Exam   Constitutional: She is oriented to person, place, and time. She appears well-nourished.   HENT:   Head: Normocephalic.   Eyes: Conjunctivae and EOM are normal. Pupils are equal, round, and reactive to light.   Neck: Normal range of motion. Neck supple.   Cardiovascular: Normal rate, regular rhythm, normal heart sounds and intact distal pulses.    Pulmonary/Chest: Effort normal and breath sounds normal. Right breast exhibits no inverted nipple, no mass, no nipple discharge, no skin change and no tenderness. Left breast exhibits no inverted nipple, no mass, no nipple discharge, no skin change and no tenderness. Breasts are symmetrical.   Abdominal: Soft. Bowel sounds are normal.   Genitourinary: Rectal exam shows anal tone normal. No labial fusion. There is no rash, tenderness, lesion or injury on the right labia. There is no rash, tenderness, lesion or injury on the left labia. Uterus is not deviated, not enlarged, not fixed and not tender. Cervix exhibits no motion tenderness, no discharge and no friability. Right adnexum displays no mass, no tenderness and no fullness. Left adnexum displays no mass, no tenderness and no fullness. No erythema, tenderness or bleeding in the vagina. No foreign body in the vagina. No signs of injury around the vagina. No vaginal discharge found.   Musculoskeletal: Normal range of motion. "   Neurological: She is alert and oriented to person, place, and time.   Skin: Skin is warm and dry.        A 3 mm brown well-demarcated macule is noted just above the nipple on the right.  She says this has been unchanged for many years.   Psychiatric: She has a normal mood and affect. Her behavior is normal. Judgment and thought content normal.   Nursing note and vitals reviewed.

## 2021-06-26 NOTE — PROGRESS NOTES
Progress Notes by Brooklyn Ryder MD at 4/11/2018  1:00 PM     Author: Brooklyn Ryder MD Service: -- Author Type: Physician    Filed: 4/11/2018  1:49 PM Encounter Date: 4/11/2018 Status: Signed    : Brooklyn Ryder MD (Physician)       Assessment:      Healthy female exam.      Plan:      Problem List Items Addressed This Visit     Preventative health care     Pap: normal 2/2017 - repeat - 2022.   Mammo: normal 3/2017. She is offered 1-2 year follow ups. She wants to wait until next year for next mammogram.  Colonoscopy:done 3/2017 and a hyperplastic rectal polyp was removed - follow up recommended in 10 years  Std testing desired:  offered   Recommend sunscreen, exercise, & healthy diet.  Offered tsh, glucose, hgb, lipid  I have had an Advance Directives discussion with the patient.   Body mass index is 20.06 kg/(m^2).   mychart active          Menopause     Asymptomatic.         Vitamin D deficiency disease - Primary     Taking 2000 - 3000 IU daily and dietary vit D, started over the past year after last blood draw. Vit D 3/2017  Recheck now.          Relevant Orders    Vitamin D, Total (25-Hydroxy)    Osteoporosis     Will repeat this year. Has been taking alendronate 70 mg po q week.  Will refill.  She tells me she does weightbearing exercise, she does not drink alcohol except for extremely rarely, she does not smoke, she gets dietary as well as supplemental vitamin D and she also is intentional about calcium intake.  In addition she has taken steps to stop drinking any carbonated beverages and avoids coffee.         Relevant Medications    alendronate (FOSAMAX) 70 MG tablet    Other Relevant Orders    Vitamin D, Total (25-Hydroxy)    DXA Bone Density Scan    Hayfever     Uses Claritin imminently for fall allergies to weeds         Ganglion cyst of left foot     Asymptomatic, incidental finding on today's exam.  At this point no intervention since it is not bothersome to her.  She will return  to clinic if at any point it is bothering her.           Other Visit Diagnoses     Encounter for screening for metabolic disorder        Relevant Orders    Comprehensive Metabolic Panel    Screening, anemia, deficiency, iron        Relevant Orders    HM1(CBC and Differential)    HM1 (CBC with Diff)    Screening, lipid        Relevant Orders    Lipid [place]    Screening for thyroid disorder        Relevant Orders    Thyroid [place]         I have had an Advance Directives discussion with the patient.     Subjective:      Angela Dailey is a 57 y.o. female who presents for an annual exam. The patient is sexually active. The patient participates in regular exercise: yes. The patient reports that there is not domestic violence in her life.     Healthy Habits:   Regular Exercise: Yes  Sunscreen Use: Yes  Healthy Diet: Yes  Dental Visits Regularly: Yes  Seat Belt: Yes  Sexually active: Yes  Self Breast Exam Monthly:Yes  Hemoccults: No  Flex Sig: No  Colonoscopy: Yes  Lipid Profile: Yes  Glucose Screen: Yes  Prevention of Osteoporosis: Yes  Last Dexa: Yes  Guns at Home:  No      There is no immunization history on file for this patient.  Immunization status: unknown status.    No exam data present    Gynecologic History  No LMP recorded (lmp unknown). Patient is postmenopausal.  Contraception: post menopausal status  Last Pap: 2017. Results were: normal  Last mammogram: 3/13/2017. Results were: normal      OB History    Para Term  AB Living   4 2 2  2 2   SAB TAB Ectopic Multiple Live Births   2          # Outcome Date GA Lbr Rohit/2nd Weight Sex Delivery Anes PTL Lv   4 Term 99    F       3 Term 95    F       2 SAB            1 SAB                   Current Outpatient Prescriptions   Medication Sig Dispense Refill   ? alendronate (FOSAMAX) 70 MG tablet TAKE 1 TAB BY MOUTH EVERY 7 DAYS, TAKE IN THE AM WITH WATER ON EMPTY STOMACH, 30 MIN BEFORE FOOD 12 tablet 0   ? cholecalciferol, vitamin D3,  "(VITAMIN D3) 2,000 unit Tab Take 2000 IU daily  0     No current facility-administered medications for this visit.      No past medical history on file.  No past surgical history on file.  Review of patient's allergies indicates no known allergies.  No family history on file.  Social History     Social History   ? Marital status:      Spouse name: N/A   ? Number of children: N/A   ? Years of education: N/A     Occupational History   ? Not on file.     Social History Main Topics   ? Smoking status: Never Smoker   ? Smokeless tobacco: Never Used   ? Alcohol use Yes      Comment: Rarely   ? Drug use: No   ? Sexual activity: Yes     Partners: Male     Birth control/ protection: Post-menopausal     Other Topics Concern   ? Not on file     Social History Narrative       Review of Systems   Review of Systems   Constitutional: Negative.    HENT: Negative.    Eyes: Negative.    Respiratory: Negative.    Cardiovascular: Negative.    Gastrointestinal: Negative.    Endocrine: Negative.    Genitourinary: Negative.    Musculoskeletal: Negative.    Skin: Negative.    Neurological: Negative.    Hematological: Negative.    Psychiatric/Behavioral: Negative.              Objective:         Vitals:    04/11/18 1302   BP: 104/62   Pulse: 72   Resp: 14   Weight: 137 lb (62.1 kg)   Height: 5' 8\" (1.727 m)     Body mass index is 20.83 kg/(m^2).    Physical   Physical Exam   Constitutional: She is oriented to person, place, and time. She appears well-developed and well-nourished.   HENT:   Head: Normocephalic.   Right Ear: External ear normal.   Left Ear: External ear normal.   Nose: Nose normal.   Mouth/Throat: Oropharynx is clear and moist.   Eyes: Conjunctivae and EOM are normal.   Neck: Normal range of motion. Neck supple.   Cardiovascular: Normal rate, regular rhythm and normal heart sounds.    Pulmonary/Chest: Effort normal and breath sounds normal. Right breast exhibits no inverted nipple, no mass, no nipple discharge, no skin " change and no tenderness. Left breast exhibits no inverted nipple, no mass, no nipple discharge, no skin change and no tenderness. Breasts are symmetrical.   Abdominal: Soft. Bowel sounds are normal.   Genitourinary:   Genitourinary Comments: Declined   Musculoskeletal: Normal range of motion.        Feet:    Neurological: She is alert and oriented to person, place, and time.   Skin: Skin is warm and dry.   No concerning or changing moles are noted by patient or myself.   Psychiatric: She has a normal mood and affect. Her behavior is normal. Judgment and thought content normal.                show

## 2021-06-27 ENCOUNTER — HEALTH MAINTENANCE LETTER (OUTPATIENT)
Age: 61
End: 2021-06-27

## 2021-06-30 NOTE — PROGRESS NOTES
Progress Notes by Brooklyn Ryder MD at 12/8/2020  1:40 PM     Author: Brooklyn Ryder MD Service: -- Author Type: Physician    Filed: 12/8/2020  2:29 PM Encounter Date: 12/8/2020 Status: Signed    : Brooklyn Ryder MD (Physician)       FEMALE PREVENTATIVE EXAM    Assessment and Plan:   Patient has been advised of split billing requirements and indicates understanding: yes    Problem List Items Addressed This Visit        Unprioritized    Preventative health care     Flu shot done.   Pap: normal 2/2017 due in 5 years   Mammo: normal 2/2017, repeat ordered.   Colonoscopy:  Colonoscopy 3/2017 plan repeat 10 years.   Std testing desired: declined.  offered  Osteoporosis prevention discussed.  vitamin d levels ordered. Recommend daily calcium and vitamin d intake to keep good bone health. Recommend weight bearing exercise, no tobacco, and limit alcohol  dexa - see osteoporosis in problem list.   Recommend sunscreen, exercise, & healthy diet.  Offered cbc, cmp, lipids and asked what other testing she  desires today  I have had an Advance Directives discussion with the patient. - she does have one, and is asked to get us a copy.   Body mass index is 20.68 kg/m .   mychart active.          Vitamin D deficiency disease - Primary     Vitamin D, Total (25-Hydroxy)   Date Value Ref Range Status   04/11/2018 61.6 30.0 - 80.0 ng/mL Final      Will recheck.          Relevant Orders    Vitamin D, Total (25-Hydroxy)    Osteoporosis     She says she got sick to the stomach from fosamax so 'I just stopped taking it'.     We discussed prolia today and she declined that.          Hayfever     Not currently an issue.          RESOLVED: Ganglion cyst of left foot     She states it resolved.            Other Visit Diagnoses     Screening, lipid        Relevant Orders    Lipid [place] FASTING    Screening for metabolic disorder        Relevant Orders    Comprehensive Metabolic Panel    Screening, anemia, deficiency, iron         Relevant Orders    HM1(CBC and Differential)    Encounter for screening mammogram for malignant neoplasm of breast        Relevant Orders    Mammo Screening Bilateral    Encounter for hepatitis C screening test for low risk patient        Screening for HIV without presence of risk factors        Immunization due        Relevant Orders    Tdap vaccine,  6yo or older,  IM (Completed)            Next follow up:  No follow-ups on file.    Immunization Review  Adult Imm Review: Missing doses of tdap  Due today, orders placed  Social History     Tobacco Use   Smoking Status Never Smoker   Smokeless Tobacco Never Used      I discussed the following with the patient:   Adult Healthy Living: Importance of regular exercise  Healthy nutrition    I have had an Advance Directives discussion with the patient.    Subjective:   Chief Complaint: Angela Dailey is an 60 y.o. female here for a preventative health visit.    Patient has been advised of split billing requirements and indicates understanding: No  HPI:  Comes in for annual exam.     Healthy Habits  Are you taking a daily aspirin? No  Do you typically exercising at least 40 min, 3-4 times per week?  Yes  Do you usually eat at least 4 servings of fruit and vegetables a day, include whole grains and fiber and avoid regularly eating high fat foods? Yes  Have you had an eye exam in the past two years? Yes  Do you see a dentist twice per year? Yes  Do you have any concerns regarding sleep? No    Safety Screen  If you own firearms, are they secured in a locked gun cabinet or with trigger locks? The patient does not own any firearms  Do you feel you are safe where you are living?: Yes (12/8/2020  1:51 PM)  Do you feel you are safe in your relationship(s)?: Yes (12/8/2020  1:51 PM)      Review of Systems:  Please see above.  The rest of the review of systems are negative for all systems.     Pap History:   Yes - updated in Problem List and Health Maintenance  "accordingly  Cancer Screening       Status Date      MAMMOGRAM Overdue 3/13/2019      Done 3/13/2017 MAMMO SCREENING BILATERAL    PAP SMEAR Next Due 2/28/2022      Done 2/28/2017 GYNECOLOGIC CYTOLOGY (PAP SMEAR)          Patient Care Team:  Brooklyn Ryder MD as PCP - General (Family Medicine)  Brooklyn Ryder MD as Assigned PCP        History     Reviewed By Date/Time Sections Reviewed    Brooklyn Ryder MD 12/8/2020  2:08 PM Social Documentation    Brooklyn Ryder MD 12/8/2020  2:07 PM Family    Brooklyn Ryder MD 12/8/2020  2:06 PM Medical, Surgical, Family    SundChelsea randallRUIZ 12/8/2020  1:51 PM Tobacco            Objective:   Vital Signs:   Visit Vitals  /64 (Patient Site: Left Arm, Patient Position: Sitting, Cuff Size: Adult Regular)   Pulse 75   Ht 5' 7.5\" (1.715 m)   Wt 134 lb (60.8 kg)   LMP  (LMP Unknown)   SpO2 98%   BMI 20.68 kg/m           PHYSICAL EXAM  Physical Exam  Constitutional:       General: She is not in acute distress.     Appearance: She is well-developed.   HENT:      Head: Normocephalic and atraumatic.      Right Ear: Tympanic membrane, ear canal and external ear normal.      Left Ear: Tympanic membrane, ear canal and external ear normal.      Nose: Nose normal.      Mouth/Throat:      Mouth: Mucous membranes are moist.      Pharynx: Oropharynx is clear.   Eyes:      Extraocular Movements: Extraocular movements intact.      Conjunctiva/sclera: Conjunctivae normal.   Neck:      Musculoskeletal: Neck supple.   Cardiovascular:      Rate and Rhythm: Normal rate and regular rhythm.      Heart sounds: Normal heart sounds.   Pulmonary:      Effort: Pulmonary effort is normal.      Breath sounds: Normal breath sounds.   Chest:      Breasts: Breasts are symmetrical.         Right: Normal. No swelling, bleeding, inverted nipple, mass, nipple discharge, skin change or tenderness.         Left: Normal. No swelling, bleeding, inverted nipple, mass, nipple discharge, skin " change or tenderness.   Abdominal:      General: Bowel sounds are normal.      Palpations: Abdomen is soft.   Genitourinary:     Comments: Not indicated.  Musculoskeletal: Normal range of motion.   Skin:     General: Skin is warm and dry.   Neurological:      Mental Status: She is alert and oriented to person, place, and time.   Psychiatric:         Mood and Affect: Mood normal.         Behavior: Behavior normal.         Thought Content: Thought content normal.         Judgment: Judgment normal.         The 10-year ASCVD risk score (Lawrence MAHNAZ Jr., et al., 2013) is: 1.7%    Values used to calculate the score:      Age: 60 years      Sex: Female      Is Non- : No      Diabetic: No      Tobacco smoker: No      Systolic Blood Pressure: 112 mmHg      Is BP treated: No      HDL Cholesterol: 84 mg/dL      Total Cholesterol: 197 mg/dL         Medication List          Accurate as of December 8, 2020  2:29 PM. If you have any questions, ask your nurse or doctor.            CONTINUE taking these medications    cholecalciferol (vitamin D3) 50 mcg (2,000 unit) Tab  Also known as: Vitamin D3  INSTRUCTIONS: Take 2000 IU daily        multivitamin per tablet  INSTRUCTIONS: Take 1 tablet by mouth daily.  Generic drug: multivitamin        vitamin C 1000 MG tablet  INSTRUCTIONS: Take 1,000 mg by mouth daily.  Generic drug: ascorbic acid (vitamin C)           STOP taking these medications    alendronate 70 MG tablet  Also known as: FOSAMAX  Stopped by: Brooklyn Ryder MD            Additional Screenings Completed Today:

## 2021-07-03 NOTE — ADDENDUM NOTE
Addendum Note by Candice Ryder MD at 3/1/2017  4:02 PM     Author: Candice Ryder MD Service: -- Author Type: Physician    Filed: 3/1/2017  4:02 PM Encounter Date: 2/28/2017 Status: Signed    : Candice Ryder MD (Physician)    Addended by: CANDICE RYDER on: 3/1/2017 04:02 PM        Modules accepted: Orders

## 2021-10-16 ENCOUNTER — HEALTH MAINTENANCE LETTER (OUTPATIENT)
Age: 61
End: 2021-10-16

## 2022-01-14 ENCOUNTER — OFFICE VISIT (OUTPATIENT)
Dept: FAMILY MEDICINE | Facility: CLINIC | Age: 62
End: 2022-01-14
Payer: COMMERCIAL

## 2022-01-14 VITALS
WEIGHT: 133.4 LBS | BODY MASS INDEX: 20.22 KG/M2 | HEIGHT: 68 IN | DIASTOLIC BLOOD PRESSURE: 72 MMHG | OXYGEN SATURATION: 98 % | SYSTOLIC BLOOD PRESSURE: 118 MMHG | HEART RATE: 95 BPM

## 2022-01-14 DIAGNOSIS — Z01.818 PREOP GENERAL PHYSICAL EXAM: Primary | ICD-10-CM

## 2022-01-14 DIAGNOSIS — H25.89 OTHER AGE-RELATED CATARACT OF BOTH EYES: ICD-10-CM

## 2022-01-14 PROBLEM — Z78.0 MENOPAUSE PRESENT: Status: ACTIVE | Noted: 2017-02-28

## 2022-01-14 PROBLEM — Z00.00 PREVENTATIVE HEALTH CARE: Status: ACTIVE | Noted: 2017-02-28

## 2022-01-14 PROBLEM — M81.0 OSTEOPOROSIS: Status: ACTIVE | Noted: 2017-03-22

## 2022-01-14 PROBLEM — J30.1 HAYFEVER: Status: ACTIVE | Noted: 2018-04-11

## 2022-01-14 PROBLEM — E55.9 VITAMIN D DEFICIENCY DISEASE: Status: ACTIVE | Noted: 2017-03-07

## 2022-01-14 PROCEDURE — 99214 OFFICE O/P EST MOD 30 MIN: CPT | Performed by: FAMILY MEDICINE

## 2022-01-14 ASSESSMENT — ENCOUNTER SYMPTOMS
PALPITATIONS: 0
SHORTNESS OF BREATH: 0
EYE PAIN: 0
SORE THROAT: 0
VOMITING: 0
COLOR CHANGE: 0
SEIZURES: 0
BACK PAIN: 0
DYSURIA: 0
FEVER: 0
HEMATURIA: 0
ABDOMINAL PAIN: 0
CHILLS: 0
COUGH: 0
ARTHRALGIAS: 0

## 2022-01-14 ASSESSMENT — MIFFLIN-ST. JEOR: SCORE: 1215.66

## 2022-01-14 NOTE — PROGRESS NOTES
[address] [facility]  [facility] [address]  Phone: [phone]  Fax: [phone]  Primary Provider: Brooklyn Ryder        PREOPERATIVE EVALUATION:  Today's date: 1/14/2022    Angela Dailey is a 61 year old female who presents for a preoperative evaluation.    Surgical Information:  Surgery/Procedure: Cataract  Surgery Location: [facility]  Surgeon: Dr. Perdomo  Surgery Date: 2/11/22 and 2/25/22  Time of Surgery: TBD  Where patient plans to recover: At home with family  Fax number for surgical facility: [phone]    Type of Anesthesia Anticipated: to be determined    Assessment & Plan     The proposed surgical procedure is considered LOW risk.    Problem List Items Addressed This Visit        Other    Other age-related cataract     Cataract surgery planned   preop today.           Other Visit Diagnoses     Preop general physical exam    -  Primary    Relevant Orders    Asymptomatic COVID-19 Virus (Coronavirus) by PCR               Risks and Recommendations:  The patient has the following additional risks and recommendations for perioperative complications:   - No identified additional risk factors other than previously addressed      RECOMMENDATION:  APPROVAL GIVEN to proceed with proposed procedure, without further diagnostic evaluation.    Subjective     HPI related to upcoming procedure: cataract    Preop Questions 1/10/2022   1. Have you ever had a heart attack or stroke? No   2. Have you ever had surgery on your heart or blood vessels, such as a stent placement, a coronary artery bypass, or surgery on an artery in your head, neck, heart, or legs? No   3. Do you have chest pain with activity? No   4. Do you have a history of  heart failure? No   5. Do you currently have a cold, bronchitis or symptoms of other infection? No   6. Do you have a cough, shortness of breath, or wheezing? No   7. Do you or anyone in your family have previous history of blood  clots? No   8. Do you or does anyone in your family have a serious bleeding problem such as prolonged bleeding following surgeries or cuts? No   9. Have you ever had problems with anemia or been told to take iron pills? No   10. Have you had any abnormal blood loss such as black, tarry or bloody stools, or abnormal vaginal bleeding? No   11. Have you ever had a blood transfusion? No   12. Are you willing to have a blood transfusion if it is medically needed before, during, or after your surgery? Yes   13. Have you or any of your relatives ever had problems with anesthesia? No   14. Do you have sleep apnea, excessive snoring or daytime drowsiness? No   15. Do you have any artifical heart valves or other implanted medical devices like a pacemaker, defibrillator, or continuous glucose monitor? No   16. Do you have artificial joints? No   17. Are you allergic to latex? No   18. Is there any chance that you may be pregnant? No       Health Care Directive:  Patient does not have a Health Care Directive or Living Will:none on file       Review of Systems   Constitutional: Negative for chills and fever.   HENT: Negative for ear pain and sore throat.    Eyes: Negative for pain and visual disturbance.   Respiratory: Negative for cough and shortness of breath.    Cardiovascular: Negative for chest pain and palpitations.   Gastrointestinal: Negative for abdominal pain and vomiting.   Genitourinary: Negative for dysuria and hematuria.   Musculoskeletal: Negative for arthralgias and back pain.   Skin: Negative for color change and rash.   Neurological: Negative for seizures and syncope.   All other systems reviewed and are negative.        Patient Active Problem List    Diagnosis Date Noted     Other age-related cataract 01/14/2022     Priority: Medium     Hayfever 04/11/2018     Priority: Medium     Last Assessment & Plan:   Formatting of this note might be different from the original.  Not currently an issue.       Osteoporosis  03/22/2017     Priority: Medium     dexa 2015- osteoporosis  dexa 2017 - osteoporosis. - repeat 1-2 yr.  Nonsmoker, alendronate 70mg po q week, vit d 28 2017,       Formatting of this note might be different from the original.  dexa 2015- osteoporosis  dexa 2017 - osteoporosis. - repeat 1-2 yr.  Nonsmoker, alendronate 70mg po q week, vit d 28 2017,       Last Assessment & Plan:   Formatting of this note might be different from the original.  She says she got sick to the stomach from fosamax so 'I just stopped taking it'.     We discussed prolia today and she declined that.       Rectal polyp 03/21/2017     Priority: Medium     Vitamin D deficiency disease 03/07/2017     Priority: Medium     3/3/17 vit d 28      Formatting of this note might be different from the original.  3/3/17 vit d 28    Last Assessment & Plan:   Formatting of this note is different from the original.  Vitamin D, Total (25-Hydroxy)   Date Value Ref Range Status   04/11/2018 61.6 30.0 - 80.0 ng/mL Final     Will recheck.       Preventative health care 02/28/2017     Priority: Medium     Last Assessment & Plan:   Formatting of this note might be different from the original.  Flu shot done.   Pap: normal 2/2017 due in 5 years   Mammo: normal 2/2017, repeat ordered.   Colonoscopy:  Colonoscopy 3/2017 plan repeat 10 years.   Std testing desired: declined.  offered  Osteoporosis prevention discussed.  vitamin d levels ordered. Recommend daily calcium and vitamin d intake to keep good bone health. Recommend weight bearing exercise, no tobacco, and limit alcohol  dexa - see osteoporosis in problem list.   Recommend sunscreen, exercise, & healthy diet.  Offered cbc, cmp, lipids and asked what other testing she  desires today  I have had an Advance Directives discussion with the patient. - she does have one, and is asked to get us a copy.   Body mass index is 20.68 kg/m .   mychart active.       Menopause present 02/28/2017     Priority: Medium     Last  "Assessment & Plan:   Formatting of this note might be different from the original.  Asymptomatic.        Past Medical History:   Diagnosis Date     Ganglion cyst of left foot 4/11/2018     History reviewed. No pertinent surgical history.  Current Outpatient Medications   Medication Sig Dispense Refill     ascorbic acid, vitamin C, (VITAMIN C) 1000 MG tablet [ASCORBIC ACID, VITAMIN C, (VITAMIN C) 1000 MG TABLET] Take 1,000 mg by mouth daily.       cholecalciferol, vitamin D3, (VITAMIN D3) 2,000 unit Tab [CHOLECALCIFEROL, VITAMIN D3, (VITAMIN D3) 2,000 UNIT TAB] Take 2000 IU daily  0     multivitamin (MULTIVITAMIN) per tablet [MULTIVITAMIN (MULTIVITAMIN) PER TABLET] Take 1 tablet by mouth daily.         No Known Allergies     Social History     Tobacco Use     Smoking status: Never Smoker     Smokeless tobacco: Never Used   Substance Use Topics     Alcohol use: Yes     Comment: Alcoholic Drinks/day: Rarely     Family History   Problem Relation Age of Onset     Heart Disease Mother         had a congenital bicuspit aortic valve. never repaired.      Osteoporosis Mother      Osteoporotic fracture Mother      Colon Cancer Father      Cerebrovascular Disease Paternal Grandmother      History   Drug Use No         Objective     /72 (BP Location: Left arm, Patient Position: Sitting, Cuff Size: Adult Regular)   Pulse 95   Ht 1.715 m (5' 7.5\")   Wt 60.5 kg (133 lb 6.4 oz)   SpO2 98%   Breastfeeding No   BMI 20.59 kg/m      Physical Exam  Constitutional:       Appearance: Normal appearance.   HENT:      Head: Normocephalic and atraumatic.   Cardiovascular:      Rate and Rhythm: Normal rate and regular rhythm.   Pulmonary:      Effort: Pulmonary effort is normal.   Musculoskeletal:         General: Normal range of motion.      Cervical back: Normal range of motion and neck supple.   Neurological:      General: No focal deficit present.      Mental Status: She is alert and oriented to person, place, and time. "           Recent Labs   Lab Test 12/08/20  1423   HGB 14.1         POTASSIUM 4.2   CR 0.81          Revised Cardiac Risk Index (RCRI):  The patient has the following serious cardiovascular risks for perioperative complications:   - No serious cardiac risks = 0 points     RCRI Interpretation: 0 points: Class I (very low risk - 0.4% complication rate)           Signed Electronically by: Brooklyn Ryder MD  Copy of this evaluation report is provided to requesting physician.

## 2022-01-14 NOTE — PATIENT INSTRUCTIONS
Preparing for Your Surgery  Getting started  A nurse will call you to review your health history and instructions. They will give you an arrival time based on your scheduled surgery time. Please be ready to share:    Your doctor's clinic name and phone number    Your medical, surgical and anesthesia history    A list of allergies and sensitivities    A list of medicines, including herbal treatments and over-the-counter drugs    Whether the patient has a legal guardian (ask how to send us the papers in advance)  Please tell us if you're pregnant--or if there's any chance you might be pregnant. Some surgeries may injure a fetus (unborn baby), so they require a pregnancy test. Surgeries that are safe for a fetus don't always need a test, and you can choose whether to have one.   If you have a child who's having surgery, please ask for a copy of Preparing for Your Child's Surgery.    Preparing for surgery    Within 30 days of surgery: Have a pre-op exam (sometimes called an H&P, or History and Physical). This can be done at a clinic or pre-operative center.  ? If you're having a , you may not need this exam. Talk to your care team.    At your pre-op exam, talk to your care team about all medicines you take. If you need to stop any medicines before surgery, ask when to start taking them again.  ? We do this for your safety. Many medicines can make you bleed too much during surgery. Some change how well surgery (anesthesia) drugs work.    Call your insurance company to let them know you're having surgery. (If you don't have insurance, call [phone].)    Call your clinic if there's any change in your health. This includes signs of a cold or flu (sore throat, runny nose, cough, rash, fever). It also includes a scrape or scratch near the surgery site.    If you have questions on the day of surgery, call your hospital or surgery center.  COVID testing  You may need to be tested for COVID-19 before having  surgery. If so, we will give you instructions.  Eating and drinking guidelines  For your safety: Unless your surgeon tells you otherwise, follow the guidelines below.    Eat and drink as usual until 8 hours before surgery. After that, no food or milk.    Drink clear liquids until 2 hours before surgery. These are liquids you can see through, like water, Gatorade and Propel Water. You may also have black coffee and tea (no cream or milk).    Nothing by mouth within 2 hours of surgery. This includes gum, candy and breath mints.    If you drink alcohol: Stop drinking it the night before surgery.    If your care team tells you to take medicine on the morning of surgery, it's okay to take it with a sip of water.  Preventing infection    Shower or bathe the night before and morning of your surgery. Follow the instructions your clinic gave you. (If no instructions, use regular soap.)    Don't shave or clip hair near your surgery site. We'll remove the hair if needed.    Don't smoke or vape the morning of surgery. You may chew nicotine gum up to 2 hours before surgery. A nicotine patch is okay.  ? Note: Some surgeries require you to completely quit smoking and nicotine. Check with your surgeon.    Your care team will make every effort to keep you safe from infection. We will:  ? Clean our hands often with soap and water (or an alcohol-based hand rub).  ? Clean the skin at your surgery site with a special soap that kills germs.  ? Give you a special gown to keep you warm. (Cold raises the risk of infection.)  ? Wear special hair covers, masks, gowns and gloves during surgery.  ? Give antibiotic medicine, if prescribed. Not all surgeries need antibiotics.  What to bring on the day of surgery    Photo ID and insurance card    Copy of your health care directive, if you have one    Glasses and hearing aides (bring cases)  ? You can't wear contacts during surgery    Inhaler and eye drops, if you use them (tell us about these when  you arrive)    CPAP machine or breathing device, if you use them    A few personal items, if spending the night    If you have . . .  ? A pacemaker, ICD (cardiac defibrillator) or other implant: Bring the ID card.  ? An implanted stimulator: Bring the remote control.  ? A legal guardian: Bring a copy of the certified (court-stamped) guardianship papers.  Please remove any jewelry, including body piercings. Leave jewelry and other valuables at home.  If you're going home the day of surgery    You must have a responsible adult drive you home. They should stay with you overnight as well.    If you don't have someone to stay with you, and you aren't safe to go home alone, we may keep you overnight. Insurance often won't pay for this.  After surgery  If it's hard to control your pain or you need more pain medicine, please call your surgeon's office.  Questions?   If you have any questions for your care team, list them here: _________________________________________________________________________________________________________________________________________________________________________ ____________________________________ ____________________________________ ____________________________________  For informational purposes only. Not to replace the advice of your health care provider. Copyright   2003, 2019 [facility]. All rights reserved. Clinically reviewed by Natacha Tinsley MD. [employer] [id number] - REV 07/21.

## 2022-02-05 ENCOUNTER — HEALTH MAINTENANCE LETTER (OUTPATIENT)
Age: 62
End: 2022-02-05

## 2022-10-01 ENCOUNTER — HEALTH MAINTENANCE LETTER (OUTPATIENT)
Age: 62
End: 2022-10-01

## 2023-02-24 ASSESSMENT — ENCOUNTER SYMPTOMS
CONSTIPATION: 0
SORE THROAT: 0
DYSURIA: 0
BREAST MASS: 0
MYALGIAS: 1
ABDOMINAL PAIN: 0
HEMATOCHEZIA: 0
JOINT SWELLING: 0
FEVER: 0
ARTHRALGIAS: 1
DIARRHEA: 0
NERVOUS/ANXIOUS: 0
HEADACHES: 0
EYE PAIN: 0
CHILLS: 0
FREQUENCY: 0
NAUSEA: 0
HEARTBURN: 0
WEAKNESS: 0
DIZZINESS: 0
PARESTHESIAS: 0
COUGH: 0
PALPITATIONS: 0
SHORTNESS OF BREATH: 0
HEMATURIA: 0

## 2023-03-03 ENCOUNTER — OFFICE VISIT (OUTPATIENT)
Dept: FAMILY MEDICINE | Facility: CLINIC | Age: 63
End: 2023-03-03
Payer: COMMERCIAL

## 2023-03-03 VITALS
HEART RATE: 68 BPM | OXYGEN SATURATION: 98 % | HEIGHT: 68 IN | RESPIRATION RATE: 16 BRPM | WEIGHT: 135 LBS | BODY MASS INDEX: 20.46 KG/M2 | SYSTOLIC BLOOD PRESSURE: 126 MMHG | DIASTOLIC BLOOD PRESSURE: 78 MMHG

## 2023-03-03 DIAGNOSIS — M81.0 OSTEOPOROSIS, UNSPECIFIED OSTEOPOROSIS TYPE, UNSPECIFIED PATHOLOGICAL FRACTURE PRESENCE: ICD-10-CM

## 2023-03-03 DIAGNOSIS — Z12.31 ENCOUNTER FOR SCREENING MAMMOGRAM FOR MALIGNANT NEOPLASM OF BREAST: ICD-10-CM

## 2023-03-03 DIAGNOSIS — K62.1 RECTAL POLYP: ICD-10-CM

## 2023-03-03 DIAGNOSIS — Z13.228 SCREENING FOR METABOLIC DISORDER: ICD-10-CM

## 2023-03-03 DIAGNOSIS — Z13.0 SCREENING, ANEMIA, DEFICIENCY, IRON: ICD-10-CM

## 2023-03-03 DIAGNOSIS — Z00.00 PREVENTATIVE HEALTH CARE: ICD-10-CM

## 2023-03-03 DIAGNOSIS — Z13.220 SCREENING, LIPID: Primary | ICD-10-CM

## 2023-03-03 DIAGNOSIS — J30.1 HAYFEVER: ICD-10-CM

## 2023-03-03 DIAGNOSIS — Z12.4 CERVICAL CANCER SCREENING: ICD-10-CM

## 2023-03-03 DIAGNOSIS — E55.9 VITAMIN D DEFICIENCY DISEASE: ICD-10-CM

## 2023-03-03 DIAGNOSIS — Z12.31 VISIT FOR SCREENING MAMMOGRAM: ICD-10-CM

## 2023-03-03 PROBLEM — H25.89 OTHER AGE-RELATED CATARACT: Status: RESOLVED | Noted: 2022-01-14 | Resolved: 2023-03-03

## 2023-03-03 PROBLEM — Z78.0 MENOPAUSE PRESENT: Status: RESOLVED | Noted: 2017-02-28 | Resolved: 2023-03-03

## 2023-03-03 PROCEDURE — 99396 PREV VISIT EST AGE 40-64: CPT | Performed by: FAMILY MEDICINE

## 2023-03-03 PROCEDURE — G0123 SCREEN CERV/VAG THIN LAYER: HCPCS | Performed by: FAMILY MEDICINE

## 2023-03-03 PROCEDURE — 99213 OFFICE O/P EST LOW 20 MIN: CPT | Mod: 25 | Performed by: FAMILY MEDICINE

## 2023-03-03 PROCEDURE — 87624 HPV HI-RISK TYP POOLED RSLT: CPT | Performed by: FAMILY MEDICINE

## 2023-03-03 ASSESSMENT — ENCOUNTER SYMPTOMS
ABDOMINAL PAIN: 0
PARESTHESIAS: 0
COUGH: 0
PALPITATIONS: 0
FEVER: 0
NAUSEA: 0
SORE THROAT: 0
HEADACHES: 0
HEMATOCHEZIA: 0
EYE PAIN: 0
NERVOUS/ANXIOUS: 0
ARTHRALGIAS: 1
WEAKNESS: 0
CONSTIPATION: 0
CHILLS: 0
MYALGIAS: 1
JOINT SWELLING: 0
DIZZINESS: 0
FREQUENCY: 0
BREAST MASS: 0
HEMATURIA: 0
SHORTNESS OF BREATH: 0
HEARTBURN: 0
DIARRHEA: 0
DYSURIA: 0

## 2023-03-03 NOTE — ASSESSMENT & PLAN NOTE
Stable/improved bone density. vit d 61.6.  Very slight increase in bone density noted.  Continue current efforts

## 2023-03-03 NOTE — PROGRESS NOTES
SUBJECTIVE:   CC: Angela is an 62 year old who presents for preventive health visit.   Patient has been advised of split billing requirements and indicates understanding: Yes  Healthy Habits:     Getting at least 3 servings of Calcium per day:  Yes    Bi-annual eye exam:  Yes    Dental care twice a year:  Yes    Sleep apnea or symptoms of sleep apnea:  None    Diet:  Gluten-free/reduced    Frequency of exercise:  6-7 days/week    Duration of exercise:  45-60 minutes    Taking medications regularly:  Yes    PHQ-2 Total Score: 0  Imm/Inj  Associated symptoms include arthralgias and myalgias. Pertinent negatives include no abdominal pain, chest pain, chills, congestion, coughing, fever, headaches, joint swelling, nausea, rash, sore throat or weakness.     Today's PHQ-2 Score:   PHQ-2 ( 1999 Pfizer) 2/24/2023   Q1: Little interest or pleasure in doing things 0   Q2: Feeling down, depressed or hopeless 0   PHQ-2 Score 0   Q1: Little interest or pleasure in doing things Not at all   Q2: Feeling down, depressed or hopeless Not at all   PHQ-2 Score 0       Social History     Tobacco Use     Smoking status: Never     Smokeless tobacco: Never   Substance Use Topics     Alcohol use: Yes     Comment: Alcoholic Drinks/day: Rarely       Alcohol Use 2/24/2023   Prescreen: >3 drinks/day or >7 drinks/week? No     Reviewed orders with patient.  Reviewed health maintenance and updated orders accordingly - Yes    Breast Cancer Screening:    FHS-7:   Breast CA Risk Assessment (FHS-7) 1/10/2022 2/24/2023   Did any of your first-degree relatives have breast or ovarian cancer? No No   Did any of your relatives have bilateral breast cancer? No No   Did any man in your family have breast cancer? No No   Did any woman in your family have breast and ovarian cancer? No No   Did any woman in your family have breast cancer before age 50 y? No No   Do you have 2 or more relatives with breast and/or ovarian cancer? No No   Do you have 2 or more  "relatives with breast and/or bowel cancer? No Yes       Mammogram Screening: Recommended mammography every 1-2 years with patient discussion and risk factor consideration  Pertinent mammograms are reviewed under the imaging tab.    History of abnormal Pap smear: NO - age 30-65 PAP every 5 years with negative HPV co-testing recommended  PAP / HPV 2/28/2017   PAP Negative for squamous intraepithelial lesion or malignancy  Electronically signed by Amber Lopez CT (ASCP) on 3/7/2017 at 12:27 PM       Reviewed and updated as needed this visit by clinical staff   Tobacco  Allergies  Meds  Problems  Med Hx  Surg Hx  Fam Hx  Soc   Hx        Reviewed and updated as needed this visit by Provider   Tobacco    Problems  Med Hx  Surg Hx  Fam Hx  Soc Hx         Review of Systems   Constitutional: Negative for chills and fever.   HENT: Negative for congestion, ear pain, hearing loss and sore throat.    Eyes: Negative for pain and visual disturbance.   Respiratory: Negative for cough and shortness of breath.    Cardiovascular: Negative for chest pain, palpitations and peripheral edema.   Gastrointestinal: Negative for abdominal pain, constipation, diarrhea, heartburn, hematochezia and nausea.   Breasts:  Negative for tenderness, breast mass and discharge.   Genitourinary: Negative for dysuria, frequency, genital sores, hematuria, pelvic pain, urgency, vaginal bleeding and vaginal discharge.   Musculoskeletal: Positive for arthralgias and myalgias. Negative for joint swelling.   Skin: Negative for rash.   Neurological: Negative for dizziness, weakness, headaches and paresthesias.   Psychiatric/Behavioral: Negative for mood changes. The patient is not nervous/anxious.         OBJECTIVE:   /78 (BP Location: Left arm, Patient Position: Sitting, Cuff Size: Adult Regular)   Pulse 68   Resp 16   Ht 1.715 m (5' 7.5\")   Wt 61.2 kg (135 lb)   SpO2 98%   BMI 20.83 kg/m    Physical Exam  Constitutional:       " Appearance: Normal appearance.   HENT:      Head: Normocephalic and atraumatic.      Right Ear: Tympanic membrane, ear canal and external ear normal.      Left Ear: Tympanic membrane, ear canal and external ear normal.      Nose: Nose normal.      Mouth/Throat:      Mouth: Mucous membranes are moist.      Pharynx: Oropharynx is clear.   Eyes:      Extraocular Movements: Extraocular movements intact.      Conjunctiva/sclera: Conjunctivae normal.      Pupils: Pupils are equal, round, and reactive to light.   Cardiovascular:      Rate and Rhythm: Normal rate and regular rhythm.      Heart sounds: Normal heart sounds.   Pulmonary:      Effort: Pulmonary effort is normal.      Breath sounds: Normal breath sounds.   Chest:   Breasts:     Breasts are symmetrical.      Right: Normal. No swelling, bleeding, inverted nipple, mass, nipple discharge, skin change or tenderness.      Left: Normal. No swelling, bleeding, inverted nipple, mass, nipple discharge, skin change or tenderness.   Abdominal:      General: Bowel sounds are normal.      Palpations: Abdomen is soft.   Genitourinary:     General: Normal vulva.      Exam position: Lithotomy position.      Vagina: Normal.      Cervix: Normal.      Uterus: Normal.       Adnexa: Right adnexa normal and left adnexa normal.      Rectum: Normal.   Musculoskeletal:         General: Normal range of motion.      Cervical back: Normal range of motion and neck supple.   Skin:     General: Skin is warm and dry.      Capillary Refill: Capillary refill takes less than 2 seconds.   Neurological:      General: No focal deficit present.      Mental Status: She is alert and oriented to person, place, and time.   Psychiatric:         Mood and Affect: Mood normal.         Behavior: Behavior normal.         Thought Content: Thought content normal.         Judgment: Judgment normal.           ASSESSMENT/PLAN:     Problem List Items Addressed This Visit        Digestive    Vitamin D deficiency  disease     We will recheck vitamin D level and titrate supplement as needed.         Relevant Orders    Vitamin D Deficiency    Rectal polyp     Colonoscopy 2017 showed rectal polyps.  Repeat colonoscopy recommended in 2027.            Musculoskeletal and Integumentary    Osteoporosis     Stable/improved bone density. vit d 61.6.  Very slight increase in bone density noted.  Continue current efforts            Other    Preventative health care     Chart reviewed: vit d 61.6.  Very slight increase in bone density noted.  Continue current efforts.  12/9/20 normal lipids  1/2021 nl mammo    Contraception - menopause.   Recommended zoster vaccine and she will check on the cost of this before getting it.  Pap: ordered today.   Mammo: ordered today.   Colonoscopy: due 2027   Std testing desired:  offered  Osteoporosis prevention discussed.  vitamin d levels ordered. Recommend daily calcium and vitamin d intake to keep good bone health. Recommend weight bearing exercise, no tobacco, and limit alcohol  dexa -see osteopenia in the problem list.  Recommend sunscreen, exercise, & healthy diet.  Offered cbc, cmp, lipids and asked what other testing she  desires today  I have had an Advance Directives discussion with the patient.   Body mass index is 20.83 kg/m .   mychart  Active.          Hayfever     Hayfever controlled by over-the-counter medications.  Such as Claritin.        Other Visit Diagnoses     Screening, lipid    -  Primary    Relevant Orders    Lipid Profile    Cervical cancer screening        Relevant Orders    Pap Screen with HPV - recommended age 30 - 65 years    Visit for screening mammogram        Relevant Orders    MA SCREENING DIGITAL BILAT - Future  (s+30)    Encounter for screening mammogram for malignant neoplasm of breast        Relevant Orders    MA SCREENING DIGITAL BILAT - Future  (s+30)    Screening for metabolic disorder        Relevant Orders    Comprehensive metabolic panel (BMP + Alb, Alk  Phos, ALT, AST, Total. Bili, TP)    Screening, anemia, deficiency, iron        Relevant Orders    CBC with platelets          Patient has been advised of split billing requirements and indicates understanding: Yes      COUNSELING:  Reviewed preventive health counseling, as reflected in patient instructions       Regular exercise       Healthy diet/nutrition       Osteoporosis prevention/bone health       Safe sex practices/STD prevention       Colorectal Cancer Screening       Advance Care Planning        She reports that she has never smoked. She has never used smokeless tobacco.    Brooklyn Ryder MD  [facility]

## 2023-03-03 NOTE — ASSESSMENT & PLAN NOTE
Chart reviewed: vit d 61.6.  Very slight increase in bone density noted.  Continue current efforts.  12/9/20 normal lipids  1/2021 nl mammo    Contraception - menopause.   Recommended zoster vaccine and she will check on the cost of this before getting it.  Pap: ordered today.   Mammo: ordered today.   Colonoscopy: due 2027   Std testing desired:  offered  Osteoporosis prevention discussed.  vitamin d levels ordered. Recommend daily calcium and vitamin d intake to keep good bone health. Recommend weight bearing exercise, no tobacco, and limit alcohol  dexa -see osteopenia in the problem list.  Recommend sunscreen, exercise, & healthy diet.  Offered cbc, cmp, lipids and asked what other testing she  desires today  I have had an Advance Directives discussion with the patient.   Body mass index is 20.83 kg/m .   yaront  Active.

## 2023-03-07 ENCOUNTER — LAB (OUTPATIENT)
Dept: LAB | Facility: CLINIC | Age: 63
End: 2023-03-07
Payer: COMMERCIAL

## 2023-03-07 DIAGNOSIS — Z13.228 SCREENING FOR METABOLIC DISORDER: ICD-10-CM

## 2023-03-07 DIAGNOSIS — Z13.0 SCREENING, ANEMIA, DEFICIENCY, IRON: ICD-10-CM

## 2023-03-07 DIAGNOSIS — Z13.220 SCREENING, LIPID: ICD-10-CM

## 2023-03-07 DIAGNOSIS — E55.9 VITAMIN D DEFICIENCY DISEASE: ICD-10-CM

## 2023-03-07 LAB
ALBUMIN SERPL BCG-MCNC: 4.5 G/DL (ref 3.5–5.2)
ALP SERPL-CCNC: 66 U/L (ref 35–104)
ALT SERPL W P-5'-P-CCNC: 16 U/L (ref 10–35)
ANION GAP SERPL CALCULATED.3IONS-SCNC: 11 MMOL/L (ref 7–15)
AST SERPL W P-5'-P-CCNC: 27 U/L (ref 10–35)
BILIRUB SERPL-MCNC: 0.4 MG/DL
BKR LAB AP GYN ADEQUACY: NORMAL
BKR LAB AP GYN INTERPRETATION: NORMAL
BKR LAB AP HPV REFLEX: NORMAL
BKR LAB AP PREVIOUS ABNORMAL: NORMAL
BUN SERPL-MCNC: 17.9 MG/DL (ref 8–23)
CALCIUM SERPL-MCNC: 9.5 MG/DL (ref 8.8–10.2)
CHLORIDE SERPL-SCNC: 105 MMOL/L (ref 98–107)
CHOLEST SERPL-MCNC: 200 MG/DL
CREAT SERPL-MCNC: 0.75 MG/DL (ref 0.51–0.95)
DEPRECATED HCO3 PLAS-SCNC: 23 MMOL/L (ref 22–29)
ERYTHROCYTE [DISTWIDTH] IN BLOOD BY AUTOMATED COUNT: 12.1 % (ref 10–15)
GFR SERPL CREATININE-BSD FRML MDRD: 90 ML/MIN/1.73M2
GLUCOSE SERPL-MCNC: 92 MG/DL (ref 70–99)
HCT VFR BLD AUTO: 38.8 % (ref 35–47)
HDLC SERPL-MCNC: 78 MG/DL
HGB BLD-MCNC: 13.4 G/DL (ref 11.7–15.7)
LDLC SERPL CALC-MCNC: 113 MG/DL
MCH RBC QN AUTO: 32.8 PG (ref 26.5–33)
MCHC RBC AUTO-ENTMCNC: 34.5 G/DL (ref 31.5–36.5)
MCV RBC AUTO: 95 FL (ref 78–100)
NONHDLC SERPL-MCNC: 122 MG/DL
PATH REPORT.COMMENTS IMP SPEC: NORMAL
PATH REPORT.COMMENTS IMP SPEC: NORMAL
PATH REPORT.RELEVANT HX SPEC: NORMAL
PLATELET # BLD AUTO: 248 10E3/UL (ref 150–450)
POTASSIUM SERPL-SCNC: 4.8 MMOL/L (ref 3.4–5.3)
PROT SERPL-MCNC: 7.1 G/DL (ref 6.4–8.3)
RBC # BLD AUTO: 4.09 10E6/UL (ref 3.8–5.2)
SODIUM SERPL-SCNC: 139 MMOL/L (ref 136–145)
TRIGL SERPL-MCNC: 45 MG/DL
WBC # BLD AUTO: 5.9 10E3/UL (ref 4–11)

## 2023-03-07 PROCEDURE — 85027 COMPLETE CBC AUTOMATED: CPT

## 2023-03-07 PROCEDURE — 82306 VITAMIN D 25 HYDROXY: CPT

## 2023-03-07 PROCEDURE — 36415 COLL VENOUS BLD VENIPUNCTURE: CPT

## 2023-03-07 PROCEDURE — 80061 LIPID PANEL: CPT

## 2023-03-07 PROCEDURE — 80053 COMPREHEN METABOLIC PANEL: CPT

## 2023-03-08 LAB — DEPRECATED CALCIDIOL+CALCIFEROL SERPL-MC: 68 UG/L (ref 20–75)

## 2023-03-09 LAB
HUMAN PAPILLOMA VIRUS 16 DNA: NEGATIVE
HUMAN PAPILLOMA VIRUS 18 DNA: NEGATIVE
HUMAN PAPILLOMA VIRUS FINAL DIAGNOSIS: NORMAL
HUMAN PAPILLOMA VIRUS OTHER HR: NEGATIVE

## 2023-03-21 ENCOUNTER — ANCILLARY PROCEDURE (OUTPATIENT)
Dept: MAMMOGRAPHY | Facility: CLINIC | Age: 63
End: 2023-03-21
Attending: FAMILY MEDICINE
Payer: COMMERCIAL

## 2023-03-21 DIAGNOSIS — Z12.31 ENCOUNTER FOR SCREENING MAMMOGRAM FOR MALIGNANT NEOPLASM OF BREAST: ICD-10-CM

## 2023-03-21 DIAGNOSIS — Z12.31 VISIT FOR SCREENING MAMMOGRAM: ICD-10-CM

## 2023-03-21 PROCEDURE — 77067 SCR MAMMO BI INCL CAD: CPT

## 2024-02-02 ENCOUNTER — PATIENT OUTREACH (OUTPATIENT)
Dept: CARE COORDINATION | Facility: CLINIC | Age: 64
End: 2024-02-02
Payer: COMMERCIAL

## 2024-02-16 ENCOUNTER — PATIENT OUTREACH (OUTPATIENT)
Dept: CARE COORDINATION | Facility: CLINIC | Age: 64
End: 2024-02-16
Payer: COMMERCIAL

## 2024-04-17 SDOH — HEALTH STABILITY: PHYSICAL HEALTH: ON AVERAGE, HOW MANY DAYS PER WEEK DO YOU ENGAGE IN MODERATE TO STRENUOUS EXERCISE (LIKE A BRISK WALK)?: 6 DAYS

## 2024-04-17 ASSESSMENT — SOCIAL DETERMINANTS OF HEALTH (SDOH): HOW OFTEN DO YOU GET TOGETHER WITH FRIENDS OR RELATIVES?: TWICE A WEEK

## 2024-04-17 NOTE — COMMUNITY RESOURCES LIST (ENGLISH)
April 17, 2024           YOUR PERSONALIZED LIST OF SERVICES & PROGRAMS           & SHELTER    Housing      [facility] Resources and Housing Information - Emergency housing  [address] [place] [address] (Distance: 3.7 miles)  Language: English  Fee: Free  Accessibility: Translation services      [facility] - Emergency Family Shelter  [address] [place] [address] (Distance: 4.7 miles)  Phone: [phone]) [phone]  Website: [email or web address]/  Language: English  Fee: Free      [facility] - Housing Stabilization Services  Phone: (519) 120-7270  Website: https://RABBL/Housing-Stabilization.html  Language: English  Hours: Mon 9:00 AM - 5:00 PM Tue 9:00 AM - 5:00 PM Wed 9:00 AM - 5:00 PM Thu 9:00 AM - 5:00 PM Fri 9:00 AM - 5:00 PM  Fee: Insurance  Accessibility: Deaf or hard of hearing, Translation services    Case Management      [facility] Resources and Housing Information - Housing search assistance  [address] [place] [address] (Distance: 3.7 miles)  Language: English  Fee: Free  Accessibility: Translation services      [facility] Resources  [address] [place] [address] (Distance: 7.6 miles)  Phone: ([phone]) [phone]  Language: English  Fee: Free  Accessibility: Translation services      [employer] - Housing Stabilization Services  Phone: (266) 286-6892  Website: https://homebasemn.com/  Language: English  Hours: Mon 8:00 AM - 4:00 PM Tue 8:00 AM - 4:00 PM Wed 8:00 AM - 4:00 PM Thu 8:00 AM - 4:00 PM Fri 8:00 AM - 4:00 PM  Fee: Free  Accessibility: Blind accommodation, Deaf or hard of hearing  Transportation Options: Free transportation    Drop-In Services      [facility] Warming or cooling center  [address] [place] [address] (Distance: 7.6 miles)  Language: English  Fee: Free      [facility] Warming or cooling  [place] [address] [place] [address] (Distance: 7.6 miles)  Language: Greenlandic, English, Dequan, Hmong, Kuwaiti, Taiwanese, Tamil  Fee: Free      [employer]  Website: http://www.laundrylove.org               IMPORTANT NUMBERS & WEBSITES        Emergency Services  911  .   United Way  211 http://211unitedway.org  .   Poison Control  (246) 653-8591 http://[place]poison.org http://wis[place]poison.org  .     Suicide and Crisis Lifeline  988 http://988Bookmateline.org  .   Childhelp [place] Child Abuse Hotline  211.242.4189 http://Childhelphotline.org   .   National Sexual Assault Hotline  (381) 159-7120 (HOPE) http://Digital Loyalty Systemn.org   .     [place] Runaway Safeline  (871) 434-3013 (RUNAWAY) http://CV[employer] GroupruTetra Discovery.Zscaler  .   Pregnancy & Postpartum Support  Call/text 803-237-6965  [place]: http://ppsupportmn.org  [place]: http://psichapters.com/wi  .   Substance Abuse National Helpline ([facility])  721-009-HELP (6875) http://Findtreatment.gov   .                DISCLAIMER: These resources have been generated via the [employer] Platform. [employer] does not endorse any service providers mentioned in this resource list. [employer] does not guarantee that the services mentioned in this resource list will be available to you or will improve your health or wellness.    [facility]

## 2024-04-24 ENCOUNTER — OFFICE VISIT (OUTPATIENT)
Dept: FAMILY MEDICINE | Facility: CLINIC | Age: 64
End: 2024-04-24
Attending: FAMILY MEDICINE
Payer: COMMERCIAL

## 2024-04-24 VITALS
HEART RATE: 65 BPM | SYSTOLIC BLOOD PRESSURE: 115 MMHG | BODY MASS INDEX: 20.17 KG/M2 | WEIGHT: 133.1 LBS | TEMPERATURE: 98.5 F | RESPIRATION RATE: 16 BRPM | OXYGEN SATURATION: 99 % | HEIGHT: 68 IN | DIASTOLIC BLOOD PRESSURE: 73 MMHG

## 2024-04-24 DIAGNOSIS — Z13.220 SCREENING, LIPID: ICD-10-CM

## 2024-04-24 DIAGNOSIS — Z00.00 HEALTH CARE MAINTENANCE: ICD-10-CM

## 2024-04-24 DIAGNOSIS — M81.0 AGE-RELATED OSTEOPOROSIS WITHOUT CURRENT PATHOLOGICAL FRACTURE: ICD-10-CM

## 2024-04-24 DIAGNOSIS — Z12.31 ENCOUNTER FOR SCREENING MAMMOGRAM FOR MALIGNANT NEOPLASM OF BREAST: ICD-10-CM

## 2024-04-24 DIAGNOSIS — Z13.228 SCREENING FOR METABOLIC DISORDER: ICD-10-CM

## 2024-04-24 DIAGNOSIS — E55.9 VITAMIN D DEFICIENCY DISEASE: ICD-10-CM

## 2024-04-24 DIAGNOSIS — Z00.00 ROUTINE GENERAL MEDICAL EXAMINATION AT A HEALTH CARE FACILITY: ICD-10-CM

## 2024-04-24 DIAGNOSIS — M67.472 GANGLION CYST OF LEFT FOOT: Primary | ICD-10-CM

## 2024-04-24 DIAGNOSIS — Z13.0 SCREENING, ANEMIA, DEFICIENCY, IRON: ICD-10-CM

## 2024-04-24 PROCEDURE — 99213 OFFICE O/P EST LOW 20 MIN: CPT | Mod: 25 | Performed by: FAMILY MEDICINE

## 2024-04-24 PROCEDURE — 99396 PREV VISIT EST AGE 40-64: CPT | Performed by: FAMILY MEDICINE

## 2024-04-24 NOTE — PROGRESS NOTES
Preventive Care Visit  [facility]  Brooklyn Ryder MD, Family Medicine  Apr 24, 2024      Assessment & Plan   Problem List Items Addressed This Visit          Digestive    Vitamin D deficiency disease     Vit d will check supplement and titrate.             Musculoskeletal and Integumentary    Age-related osteoporosis without current pathological fracture     Osteoporosis. Dexa ordered to follow up. She took fosamax x 2 years but stopped due to GI upset. And if worsening would refer to osteoporosis specialist to consider infusion of bisphosphonate.          Relevant Orders    DX Bone Density    Ganglion cyst of left foot - Primary     Ganglion cyst left foot, comes and goes for years. Podiatry consult ordered.          Relevant Orders    Orthopedic  Referral       Other    Health care maintenance     Contraception - menopause.   Vaccines: rsv and zoster.   Pap: never had abnormal paps. Normal pap and hpv in 2023.   Mammo: nl march 2023  Colonoscopy: nl 2017, repeat 2027   Std testing desired:  offered  Osteoporosis prevention discussed.  vitamin d levels ordered. Recommend daily calcium and vitamin d intake to keep good bone health. Recommend weight bearing exercise, no tobacco, and limit alcohol  dexa - last done in 2018.   Recommend sunscreen, exercise, & healthy diet.  Offered cbc, cmp, lipids and asked what other testing she  desires today  I have had an Advance Directives discussion with the patient. She  has this and will bring it in.   Body mass index is 20.54 kg/m .   mychart active.           Other Visit Diagnoses       Encounter for screening mammogram for malignant neoplasm of breast        Relevant Orders    *MA Screening Digital Bilateral    Screening for metabolic disorder        Relevant Orders    Comprehensive metabolic panel (BMP + Alb, Alk Phos, ALT, AST, Total. Bili, TP)    Screening, anemia, deficiency, iron        Relevant Orders    CBC with platelets     Screening, lipid        Relevant Orders    Lipid Profile    Routine general medical examination at a health care facility                 Patient has been advised of split billing requirements and indicates understanding: Yes       Counseling  Appropriate preventive services were discussed with this patient, including applicable screening as appropriate for fall prevention, nutrition, physical activity, Tobacco-use cessation, weight loss and cognition.  Checklist reviewing preventive services available has been given to the patient.  Reviewed patient's diet, addressing concerns and/or questions.   She is at risk for psychosocial distress and has been provided with information to reduce risk.       Real Miller is a 63 year old, presenting for the following:  Physical (Ganglion cyst on top of left foot) and Imm/Inj (Declined RSV and Zoster vaccines today )        4/24/2024     3:07 PM   Additional Questions   Roomed by Luke Lindsay MA   Accompanied by Self         4/24/2024     3:07 PM   Patient Reported Additional Medications   Patient reports taking the following new medications None        Health Care Directive  Patient does not have a Health Care Directive or Living Will: Patient states has Advance Directive and will bring in a copy to clinic.    HPI  Annual exam      4/17/2024   General Health   How would you rate your overall physical health? Excellent   Feel stress (tense, anxious, or unable to sleep) Only a little   (!) STRESS CONCERN - declined meds and declined counseling      4/17/2024   Nutrition   Three or more servings of calcium each day? Yes   Diet: Gluten-free/reduced   How many servings of fruit and vegetables per day? 4 or more   How many sweetened beverages each day? 0-1         4/17/2024   Exercise   Days per week of moderate/strenous exercise 6 days         4/17/2024   Social Factors   Frequency of gathering with friends or relatives Twice a week   Worry food won't last until get money to  buy more No   Food not last or not have enough money for food? No   Do you have housing?  No   Are you worried about losing your housing? No   Lack of transportation? No   Unable to get utilities (heat,electricity)? No   Want help with housing or utility concern? No   (!) HOUSING CONCERN PRESENT      4/17/2024   Fall Risk   Fallen 2 or more times in the past year? No   Trouble with walking or balance? No          4/17/2024   Dental   Dentist two times every year? Yes         4/17/2024   TB Screening   Were you born outside of the [place]? No         Today's PHQ-2 Score:       4/24/2024     7:49 AM   PHQ-2 ( 1999 [employer])   Q1: Little interest or pleasure in doing things 0   Q2: Feeling down, depressed or hopeless 0   PHQ-2 Score 0   Q1: Little interest or pleasure in doing things Not at all   Q2: Feeling down, depressed or hopeless Not at all   PHQ-2 Score 0           4/17/2024   Substance Use   Alcohol more than 3/day or more than 7/wk No   Do you use any other substances recreationally? No     Social History     Tobacco Use    Smoking status: Never    Smokeless tobacco: Never   Vaping Use    Vaping status: Never Used   Substance Use Topics    Alcohol use: Yes     Comment: Alcoholic Drinks/day: Rarely    Drug use: No         4/17/2024   Breast Cancer Screening   Family history of breast, colon, or ovarian cancer? Yes         4/17/2024   LAST FHS-7 RESULTS   1st degree relative breast or ovarian cancer No   Any relative bilateral breast cancer No   Any male have breast cancer No   Any ONE woman have BOTH breast AND ovarian cancer No   Any woman with breast cancer before 50yrs No   2 or more relatives with breast AND/OR ovarian cancer No   2 or more relatives with breast AND/OR bowel cancer Unknown       Mammogram Screening - Mammogram every 1-2 years updated in Health Maintenance based on mutual decision making        4/17/2024   STI Screening   New sexual partner(s) since last STI/HIV test? No     History of abnormal Pap  "smear: NO - age 30-65 PAP every 5 years with negative HPV co-testing recommended        Latest Ref Rng & Units 3/3/2023     3:20 PM 2/28/2017     4:10 PM   PAP / HPV   PAP  Negative for Intraepithelial Lesion or Malignancy (NILM)  Negative for squamous intraepithelial lesion or malignancy  Electronically signed by Amber Lopez CT (ASCP) on 3/7/2017 at 12:27 PM      HPV 16 DNA Negative Negative     HPV 18 DNA Negative Negative     Other HR HPV Negative Negative       ASCVD Risk   The 10-year ASCVD risk score (Robert CALLE, et al., 2019) is: 2.7%    Values used to calculate the score:      Age: 63 years      Sex: Female      Is Non- : No      Diabetic: No      Tobacco smoker: No      Systolic Blood Pressure: 115 mmHg      Is BP treated: No      HDL Cholesterol: 78 mg/dL      Total Cholesterol: 200 mg/dL    Reviewed and updated as needed this visit by Provider   Tobacco  Allergies  Meds  Problems  Med Hx  Surg Hx  Fam Hx  Soc   Hx Sexual Activity             Objective    Exam  /73 (BP Location: Left arm, Patient Position: Sitting, Cuff Size: Adult Regular)   Pulse 65   Temp 98.5  F (36.9  C) (Oral)   Resp 16   Ht 1.715 m (5' 7.5\")   Wt 60.4 kg (133 lb 1.6 oz)   SpO2 99%   BMI 20.54 kg/m     Estimated body mass index is 20.54 kg/m  as calculated from the following:    Height as of this encounter: 1.715 m (5' 7.5\").    Weight as of this encounter: 60.4 kg (133 lb 1.6 oz).    Physical Exam  Constitutional:       Appearance: Normal appearance.   HENT:      Head: Normocephalic and atraumatic.   Cardiovascular:      Rate and Rhythm: Normal rate and regular rhythm.   Pulmonary:      Effort: Pulmonary effort is normal.      Breath sounds: Normal breath sounds.   Abdominal:      General: Bowel sounds are normal.      Palpations: Abdomen is soft.   Musculoskeletal:         General: Normal range of motion.      Cervical back: Normal range of motion and neck supple. "   Skin:     General: Skin is warm and dry.      Capillary Refill: Capillary refill takes less than 2 seconds.   Neurological:      General: No focal deficit present.      Mental Status: She is alert and oriented to person, place, and time.               Signed Electronically by: Brooklyn Ryder MD

## 2024-04-24 NOTE — PATIENT INSTRUCTIONS
Preventive Care Advice   This is general advice given by our system to help you stay healthy. However, your care team may have specific advice just for you. Please talk to your care team about your preventive care needs.  Nutrition  Eat 5 or more servings of fruits and vegetables each day.  Try wheat bread, brown rice and whole grain pasta (instead of white bread, rice, and pasta).  Get enough calcium and vitamin D. Check the label on foods and aim for 100% of the RDA (recommended daily allowance).  Lifestyle  Exercise at least 150 minutes each week   (30 minutes a day, 5 days a week).  Do muscle strengthening activities 2 days a week. These help control your weight and prevent disease.  No smoking.  Wear sunscreen to prevent skin cancer.  Have a dental exam and cleaning every 6 months.  Yearly exams  See your health care team every year to talk about:  Any changes in your health.  Any medicines your care team has prescribed.  Preventive care, family planning, and ways to prevent chronic diseases.  Shots (vaccines)   HPV shots (up to age 26), if you've never had them before.  Hepatitis B shots (up to age 59), if you've never had them before.  COVID-19 shot: Get this shot when it's due.  Flu shot: Get a flu shot every year.  Tetanus shot: Get a tetanus shot every 10 years.  Pneumococcal, hepatitis A, and RSV shots: Ask your care team if you need these based on your risk.  Shingles shot (for age 50 and up).  General health tests  Diabetes screening:  Starting at age 35, Get screened for diabetes at least every 3 years.  If you are younger than age 35, ask your care team if you should be screened for diabetes.  Cholesterol test: At age 39, start having a cholesterol test every 5 years, or more often if advised.  Bone density scan (DEXA): At age 50, ask your care team if you should have this scan for osteoporosis (brittle bones).  Hepatitis C: Get tested at least once in your life.  STIs (sexually transmitted  infections)  Before age 24: Ask your care team if you should be screened for STIs.  After age 24: Get screened for STIs if you're at risk. You are at risk for STIs (including HIV) if:  You are sexually active with more than one person.  You don't use condoms every time.  You or a partner was diagnosed with a sexually transmitted infection.  If you are at risk for HIV, ask about PrEP medicine to prevent HIV.  Get tested for HIV at least once in your life, whether you are at risk for HIV or not.  Cancer screening tests  Cervical cancer screening: If you have a cervix, begin getting regular cervical cancer screening tests at age 21. Most people who have regular screenings with normal results can stop after age 65. Talk about this with your provider.  Breast cancer scan (mammogram): If you've ever had breasts, begin having regular mammograms starting at age 40. This is a scan to check for breast cancer.  Colon cancer screening: It is important to start screening for colon cancer at age 45.  Have a colonoscopy test every 10 years (or more often if you're at risk) Or, ask your provider about stool tests like a FIT test every year or Cologuard test every 3 years.  To learn more about your testing options, visit: https://www.[email or web address]/167812.pdf.  For help making a decision, visit: https://bit.ly/xy70235.  Prostate cancer screening test: If you have a prostate and are age 55 to 69, ask your provider if you would benefit from a yearly prostate cancer screening test.  Lung cancer screening: If you are a current or former smoker age 50 to 80, ask your care team if ongoing lung cancer screenings are right for you.  For informational purposes only. Not to replace the advice of your health care provider. Copyright   2023 [employer]. All rights reserved. Clinically reviewed by the [facility]. [employer] [id number] - REV 01/24.    Learning About Stress  What is stress?     Stress is your  body's response to a hard situation. Your body can have a physical, emotional, or mental response. Stress is a fact of life for most people, and it affects everyone differently. What causes stress for you may not be stressful for someone else.  A lot of things can cause stress. You may feel stress when you go on a job interview, take a test, or run a race. This kind of short-term stress is normal and even useful. It can help you if you need to work hard or react quickly. For example, stress can help you finish an important job on time.  Long-term stress is caused by ongoing stressful situations or events. Examples of long-term stress include long-term health problems, ongoing problems at work, or conflicts in your family. Long-term stress can harm your health.  How does stress affect your health?  When you are stressed, your body responds as though you are in danger. It makes hormones that speed up your heart, make you breathe faster, and give you a burst of energy. This is called the fight-or-flight stress response. If the stress is over quickly, your body goes back to normal and no harm is done.  But if stress happens too often or lasts too long, it can have bad effects. Long-term stress can make you more likely to get sick, and it can make symptoms of some diseases worse. If you tense up when you are stressed, you may develop neck, shoulder, or low back pain. Stress is linked to high blood pressure and heart disease.  Stress also harms your emotional health. It can make you edmonds, tense, or depressed. Your relationships may suffer, and you may not do well at work or school.  What can you do to manage stress?  You can try these things to help manage stress:   Do something active. Exercise or activity can help reduce stress. Walking is a great way to get started. Even everyday activities such as housecleaning or yard work can help.  Try yoga or marjan chi. These techniques combine exercise and meditation. You may need  some training at first to learn them.  Do something you enjoy. For example, listen to music or go to a movie. Practice your hobby or do volunteer work.  Meditate. This can help you relax, because you are not worrying about what happened before or what may happen in the future.  Do guided imagery. Imagine yourself in any setting that helps you feel calm. You can use online videos, books, or a teacher to guide you.  Do breathing exercises. For example:  From a standing position, bend forward from the waist with your knees slightly bent. Let your arms dangle close to the floor.  Breathe in slowly and deeply as you return to a standing position. Roll up slowly and lift your head last.  Hold your breath for just a few seconds in the standing position.  Breathe out slowly and bend forward from the waist.  Let your feelings out. Talk, laugh, cry, and express anger when you need to. Talking with supportive friends or family, a counselor, or a maury leader about your feelings is a healthy way to relieve stress. Avoid discussing your feelings with people who make you feel worse.  Write. It may help to write about things that are bothering you. This helps you find out how much stress you feel and what is causing it. When you know this, you can find better ways to cope.  What can you do to prevent stress?  You might try some of these things to help prevent stress:  Manage your time. This helps you find time to do the things you want and need to do.  Get enough sleep. Your body recovers from the stresses of the day while you are sleeping.  Get support. Your family, friends, and community can make a difference in how you experience stress.  Limit your news feed. Avoid or limit time on social media or news that may make you feel stressed.  Do something active. Exercise or activity can help reduce stress. Walking is a great way to get started.  Where can you learn more?  Go to https://www.healthwise.net/patiented  Enter N032 in the  "search box to learn more about \"Learning About Stress.\"  Current as of: October 24, 2023               Content Version: 14.0    9988-8836 [employer].   Care instructions adapted under license by your healthcare professional. If you have questions about a medical condition or this instruction, always ask your healthcare professional. [employer] disclaims any warranty or liability for your use of this information.      "

## 2024-04-24 NOTE — ASSESSMENT & PLAN NOTE
Osteoporosis. Dexa ordered to follow up. She took fosamax x 2 years but stopped due to GI upset. And if worsening would refer to osteoporosis specialist to consider infusion of bisphosphonate.

## 2024-04-24 NOTE — ASSESSMENT & PLAN NOTE
Contraception - menopause.   Vaccines: rsv and zoster.   Pap: never had abnormal paps. Normal pap and hpv in 2023.   Mammo: nl march 2023  Colonoscopy: nl 2017, repeat 2027   Std testing desired:  offered  Osteoporosis prevention discussed.  vitamin d levels ordered. Recommend daily calcium and vitamin d intake to keep good bone health. Recommend weight bearing exercise, no tobacco, and limit alcohol  dexa - last done in 2018.   Recommend sunscreen, exercise, & healthy diet.  Offered cbc, cmp, lipids and asked what other testing she  desires today  I have had an Advance Directives discussion with the patient. She  has this and will bring it in.   Body mass index is 20.54 kg/m .   mychart active.

## 2024-04-30 ENCOUNTER — LAB (OUTPATIENT)
Dept: LAB | Facility: CLINIC | Age: 64
End: 2024-04-30
Payer: COMMERCIAL

## 2024-04-30 DIAGNOSIS — Z13.228 SCREENING FOR METABOLIC DISORDER: ICD-10-CM

## 2024-04-30 DIAGNOSIS — Z13.220 SCREENING, LIPID: ICD-10-CM

## 2024-04-30 DIAGNOSIS — Z13.0 SCREENING, ANEMIA, DEFICIENCY, IRON: ICD-10-CM

## 2024-04-30 LAB
ALBUMIN SERPL BCG-MCNC: 4.5 G/DL (ref 3.5–5.2)
ALP SERPL-CCNC: 64 U/L (ref 40–150)
ALT SERPL W P-5'-P-CCNC: 21 U/L (ref 0–50)
ANION GAP SERPL CALCULATED.3IONS-SCNC: 9 MMOL/L (ref 7–15)
AST SERPL W P-5'-P-CCNC: 27 U/L (ref 0–45)
BILIRUB SERPL-MCNC: 0.5 MG/DL
BUN SERPL-MCNC: 12.2 MG/DL (ref 8–23)
CALCIUM SERPL-MCNC: 9.7 MG/DL (ref 8.8–10.2)
CHLORIDE SERPL-SCNC: 104 MMOL/L (ref 98–107)
CHOLEST SERPL-MCNC: 192 MG/DL
CREAT SERPL-MCNC: 0.81 MG/DL (ref 0.51–0.95)
DEPRECATED HCO3 PLAS-SCNC: 25 MMOL/L (ref 22–29)
EGFRCR SERPLBLD CKD-EPI 2021: 82 ML/MIN/1.73M2
ERYTHROCYTE [DISTWIDTH] IN BLOOD BY AUTOMATED COUNT: 12.1 % (ref 10–15)
FASTING STATUS PATIENT QL REPORTED: YES
GLUCOSE SERPL-MCNC: 86 MG/DL (ref 70–99)
HCT VFR BLD AUTO: 39 % (ref 35–47)
HDLC SERPL-MCNC: 79 MG/DL
HGB BLD-MCNC: 13.2 G/DL (ref 11.7–15.7)
LDLC SERPL CALC-MCNC: 99 MG/DL
MCH RBC QN AUTO: 32.6 PG (ref 26.5–33)
MCHC RBC AUTO-ENTMCNC: 33.8 G/DL (ref 31.5–36.5)
MCV RBC AUTO: 96 FL (ref 78–100)
NONHDLC SERPL-MCNC: 113 MG/DL
PLATELET # BLD AUTO: 215 10E3/UL (ref 150–450)
POTASSIUM SERPL-SCNC: 4.1 MMOL/L (ref 3.4–5.3)
PROT SERPL-MCNC: 7.2 G/DL (ref 6.4–8.3)
RBC # BLD AUTO: 4.05 10E6/UL (ref 3.8–5.2)
SODIUM SERPL-SCNC: 138 MMOL/L (ref 135–145)
TRIGL SERPL-MCNC: 72 MG/DL
WBC # BLD AUTO: 4.9 10E3/UL (ref 4–11)

## 2024-04-30 PROCEDURE — 85027 COMPLETE CBC AUTOMATED: CPT

## 2024-04-30 PROCEDURE — 36415 COLL VENOUS BLD VENIPUNCTURE: CPT

## 2024-04-30 PROCEDURE — 80061 LIPID PANEL: CPT

## 2024-04-30 PROCEDURE — 80053 COMPREHEN METABOLIC PANEL: CPT

## 2024-05-02 ENCOUNTER — OFFICE VISIT (OUTPATIENT)
Dept: PODIATRY | Facility: CLINIC | Age: 64
End: 2024-05-02
Attending: FAMILY MEDICINE
Payer: COMMERCIAL

## 2024-05-02 VITALS — DIASTOLIC BLOOD PRESSURE: 72 MMHG | HEART RATE: 82 BPM | OXYGEN SATURATION: 97 % | SYSTOLIC BLOOD PRESSURE: 117 MMHG

## 2024-05-02 DIAGNOSIS — M67.472 GANGLION CYST OF LEFT FOOT: ICD-10-CM

## 2024-05-02 PROCEDURE — 99244 OFF/OP CNSLTJ NEW/EST MOD 40: CPT | Performed by: PODIATRIST

## 2024-05-02 NOTE — Clinical Note
"    5/2/2024         RE: Angela Dailey  [address]  [facility] [address]        Dear Colleague,    Thank you for referring your patient, Angela Dailey, to the [facility]. Please see a copy of my visit note below.    FOOT AND ANKLE SURGERY/PODIATRY CONSULT NOTE         ASSESSMENT:   Ganglion cyst left foot      TREATMENT:  I have recommended surgical excision of the large cyst left foot.  The patient was told that this procedure will be performed on an outpatient basis under local anesthesia.  She was told the procedure will take approximately 15 to 20 minutes to perform.  She would then be discharged weightbearing in a postop shoe for 2 weeks.  The patient was asked to go n.p.o. at midnight prior to the procedure and she was asked to see her primary care physician for preoperative consultation.  All pertinent questions were invited and answered        HPI: I was asked to see Angela Dailey today  ***.  The patient was seen in consultation at the request of Brooklyn Ryder MD for evaluation and treatment of ganglion cyst left foot.     {PAST MEDICAL HISTORY:[id number]::\"***\"}    {SOCIAL HISTORY:[id number]::\"***\"}     No Known Allergies       Current Outpatient Medications:      ascorbic acid, vitamin C, (VITAMIN C) 1000 MG tablet, [ASCORBIC ACID, VITAMIN C, (VITAMIN C) 1000 MG TABLET] Take 1,000 mg by mouth daily., Disp: , Rfl:      cholecalciferol, vitamin D3, (VITAMIN D3) 2,000 unit Tab, [CHOLECALCIFEROL, VITAMIN D3, (VITAMIN D3) 2,000 UNIT TAB] Take 2000 IU daily, Disp: , Rfl: 0     multivitamin (MULTIVITAMIN) per tablet, [MULTIVITAMIN (MULTIVITAMIN) PER TABLET] Take 1 tablet by mouth daily., Disp: , Rfl:      Family History   Problem Relation Age of Onset     Heart Disease Mother         had a congenital bicuspit aortic valve. never repaired.      Osteoporosis Mother      Osteoporotic fracture Mother      Colon Cancer Father      Cerebrovascular Disease Paternal Grandmother     "     Social History     Socioeconomic History     Marital status:      Spouse name: Not on file     Number of children: 2     Years of education: masters in Education     Highest education level: Not on file   Occupational History     Not on file   Tobacco Use     Smoking status: Never     Smokeless tobacco: Never   Vaping Use     Vaping status: Never Used   Substance and Sexual Activity     Alcohol use: Yes     Comment: Alcoholic Drinks/day: Rarely     Drug use: No     Sexual activity: Not Currently     Partners: Male     Birth control/protection: Post-menopausal     Comment:  Flakito   Other Topics Concern     Parent/sibling w/ CABG, MI or angioplasty before 65F 55M? No   Social History Narrative    12/8/2020 she and her  are at home and she is not working. Kids are young adults - 2 girls.     3/3/2023 daughters are 27 and 24 - one is a  and the other is an occ therapist. She is exercising, biking, hiking, traveling.    4/24/2024 lives at home with  of 42 years. The younger daughter is coming back for [employer] school. And her older daughter has a new baby in [place].      Social Determinants of Health     Financial Resource Strain: Low Risk  (4/17/2024)    Financial Resource Strain      Within the past 12 months, have you or your family members you live with been unable to get utilities (heat, electricity) when it was really needed?: No   Food Insecurity: Low Risk  (4/17/2024)    Food Insecurity      Within the past 12 months, did you worry that your food would run out before you got money to buy more?: No      Within the past 12 months, did the food you bought just not last and you didn t have money to get more?: No   Transportation Needs: Low Risk  (4/17/2024)    Transportation Needs      Within the past 12 months, has lack of transportation kept you from medical appointments, getting your medicines, non-medical meetings or appointments, work, or from getting things that you  need?: No   Physical Activity: Unknown (4/17/2024)    Exercise Vital Sign      Days of Exercise per Week: 6 days      Minutes of Exercise per Session: Not on file   Stress: No Stress Concern Present (4/17/2024)    [employer] - Occupational Stress Questionnaire      Feeling of Stress : Only a little   Social Connections: Unknown (4/17/2024)    Social Connection and Isolation Panel [NHANES]      Frequency of Communication with Friends and Family: Not on file      Frequency of Social Gatherings with Friends and Family: Twice a week      Attends Druze Services: Not on file      Active Member of Clubs or Organizations: Not on file      Attends Club or Organization Meetings: Not on file      Marital Status: Not on file   Interpersonal Safety: Low Risk  (4/24/2024)    Interpersonal Safety      Do you feel physically and emotionally safe where you currently live?: Yes      Within the past 12 months, have you been hit, slapped, kicked or otherwise physically hurt by someone?: No      Within the past 12 months, have you been humiliated or emotionally abused in other ways by your partner or ex-partner?: No   Housing Stability: Low Risk  (4/24/2024)    Housing Stability      Do you have housing? : Yes      Are you worried about losing your housing?: No   Recent Concern: Housing Stability - High Risk (4/17/2024)    Housing Stability      Do you have housing? : No      Are you worried about losing your housing?: No        Review of Systems - Patient denies fever, chills, rash, wound, stiffness, limping, numbness, weakness, heart burn, blood in stool, chest pain with activity, calf pain when walking, shortness of breath with activity, chronic cough, easy bleeding/bruising, swelling of ankles, excessive thirst, fatigue, depression, anxiety.  Patient admits to cyst left foot.      OBJECTIVE:  Appearance: alert, well appearing, and in no distress.    There were no vitals taken for this visit.      There is no height or weight on file to calculate BMI.     General appearance: Patient is alert and fully cooperative with history & exam.  No sign of distress is noted during the visit.  Psychiatric: Affect is pleasant & appropriate.  Patient appears motivated to improve health.  Respiratory: Breathing is regular & unlabored while sitting.  HEENT: Hearing is intact to spoken word.  Speech is clear.  No gross evidence of visual impairment that would impact ambulation.    Vascular: Dorsalis pedis and posterior tibial pulses are palpable. There is no pedal hair growth bilaterally.  CFT < 3 sec from anterior tibial surface to distal digits bilaterally. There is no appreciable edema noted.  Dermatologic: Turgor and texture are within normal limits. No coloration or temperature changes. No primary or secondary lesions noted.  Neurologic: All epicritic and proprioceptive sensations are grossly intact bilaterally.  Musculoskeletal: All active and passive ankle, subtalar, midtarsal, and 1st MPJ range of motion are grossly intact without pain or crepitus. Manual muscle strength is within normal limits bilaterally. All dorsiflexors, plantarflexors, invertors, evertors are intact bilaterally. Tenderness present to *** on palpation.  No tenderness to the left foot or ankle with range of motion. Calf is soft/non-tender with/without warmth/induration    Imaging:       No images are attached to the encounter or orders placed in the encounter.     No results found.   No results found.       Mark Arroyo DPM  [facility] Foot & Ankle Surgery/Podiatry         Again, thank you for allowing me to participate in the care of your patient.        Sincerely,        Mark Denny DPM

## 2024-05-02 NOTE — PATIENT INSTRUCTIONS
Angela,      Your surgery with [facility] has been ordered. A  will contact you in the next few days to schedule. Or feel free to call [phone] to schedule sooner.     Procedure:   (L) Foot, ganglion cyst     Procedure Date :     *A surgery nurse will call you 1-2 days before surgery to inform you of the time of arrival for surgery.    Surgeon:       Admission Type:   Outpatient    Procedure Location:   [facility]:  [address] [place] [address] [address] [place] [address] (Fax: [phone])    Post Operative Appointment:       Preparation Instructions to complete:    []  You will need a Pre-op Physical within 30 days before surgery with your primary care provider. This exam is required by the anesthesiologist to ensure a safe surgical experience.    Failure  to obtain your pre-op physical will lead to cancellation of your surgery  Call them right away to schedule this. Please ensure your Preoperative Physical is faxed to the Hospital (numbers listed above)    [] Preoperative Medication Instructions  We would like you to stop your anticoagulation medications 3-5 days before surgery HOWEVER contact your prescribing provider for instructions before discontinuing any medications. (Examples: Coumadin, Plavix, Xarelto, Eliquis, Pradaxa, Effient, Brilinta) If you are on Coumadin, we would like the goal INR ? 1.2.  IT IS OK TO STAY ON ASPIRIN PRIOR TO SURGERY.   Hold Ibuprofen, Herbal Supplements and Vitamin E 7 days before  Stop Cialis, Levitra and Viagra 2-3 days before surgery  If you take these diabetic medications, please discuss with your primary doctor and follow the hold instructions:   Hold seven (7) days prior for once weekly injectable doses [semaglutide (Ozempic, Wegovy), dulaglutide (Trulicity), exenatide ER (Bydureon), tirzepatide (Mounjaro)]  Hold the day before and day of for once daily injectable GLP-1 agonists [exenatide (Byetta), liraglutide (Saxenda, Victoza)]  Hold  "seven (7) days for oral semaglutide (Rybelsus)     [] Fasting Requirements  Nothing to eat for 8 hours before surgery unless instructed differently by the surgery nurse.  You may have clear liquids up to two hours before your arrival time (coffee, tea, water, or Gatorade. No cream or milk)  If your primary care provider has instructed you to continue oral medications, you may take them on the morning of surgery with a small sip of water.    No alcohol or smoking after 12:00am the day of surgery    [] Contact your insurance regarding coverage  If you would like a Good [place] Estimate for your upcoming procedure at [facility] Location, contact Cost of Care Estimates   Advocates are available be phone Monday through Friday 9am - 3pm   at [phone]  You may also submit a request online at [email or web address] and select the \"Submit Pricing Inquiry\" link     For all self-pay, estimate, or anesthesia billing questions at [facility], the contact information is below:    Who to contact: Miladis BOUCHER  [facility] Anesthesia Network number: [phone]  Prepay number: [phone]    The billing office at the [facility] will contact you with the facility charge estimate but you may also reach them at [phone] with questions.    [] DO NOT BRING FMLA WITH TO SURGERY.  These should be sent to the provider's office by fax to [phone].    [] Day of Surgery  Medications - Take as indicated with sips of water.   Wear comfortable loose fitting clothes. Wear your glasses-Not contacts. Do not wear jewelry and remove body piercing's. Surgery may be cancelled if they are not removed.   You may have 1 family member wait in the lobby during your surgery. Visitor restrictions are subject to change. Please verify with the surgery nurse when they call.   If same day surgery-Have a someone come with you to surgery that can help you understand the surgeon's instructions, drive you " home and stay with you overnight the first night.    [] You will receive a call from a surgery nurse 1-3 days prior to surgery. They will go over more details with you.         Frequently Asked Questions    WHAT DO I DO WHEN I COME HOME FROM SURGERY?    After surgery and/ or your hospital stay you may be tired and drowsy. Rest, but make sure to get up and walk around every couple of hours to circulate your blood. If you are non-weight bearing do not put any weight on your foot.  Be sure to take your medications as directed. Try to get a restful sleep and begin more normal activities as tolerated.    HOW MUCH PAIN SHOULD I EXPECT AND WILL I HAVE PAIN MEDICATION?    Everyone tolerates pain differently. Still, each type of surgery involves a certain level and type of pain. Generally most people feel better within a few days but keep in mind that everyone has a different tolerance to pain. All medications should be taken as directed. All medications can have side effects such as bleeding, upset stomach, headaches, dizziness, constipation, etc. If you have any major problems or allergic reaction, stop the medication and call the office. If you only have a little pain, you may simply take Tylenol or Ibuprofen as directed on the label.     WHAT ABOUT MY DRESSING AND WHEN DO I COME BACK TO THE OFFICE?    The outer dressing stays in place for 7 days and when you come in for your first post-op we will remove it.  Some patients may have staples, zipper or sutures; these stay in for 10-14 days and will be removed at your 2nd post-op visit. After 24 hours you may shower using shower precautions.    WHAT ABOUT SWELLING, REDNESS, BRUISING OR DRAINAGE?    It is normal to have some swelling, and bruising after surgery. It gradually subsides but may be present for several weeks. Elevation and icing will help to reduce the swelling more rapidly.  If your bandage is really tight after 24 hours you may cut the bandage an inch by the  toes or under your foot and by your ankle.  Ice therapy often works better than oral pain medication. Using cold therapy is recommended every hour for 20 minutes.    WHEN CAN I TAKE A BATH?    You can take a bath as long as the wound has no drainage and is fully healed without a scab. This generally takes about 2 weeks.  Unless you get the bandaged protector from above then you can take a shower when you feel up to it.    WHEN CAN I RETURN TO WORK?    You may return to work to an office-type job whenever you feel comfortable enough. The only restriction would be your own motivation and pain tolerance. If your job requires vigorous activities you may be off 1-4 weeks which is determined by what surgery you have and your operating physician.     WHAT ABOUT DRIVING OR FLYING?    As a general rule, you may resume driving as soon as you are comfortable and fully alert and off narcotic pain medications. If you are traveling by plane within 4 weeks of surgery, perform range of motion exercises of the legs and feet during the flight. Get up and walk around frequently to prevent blood clots.      WHEN CAN I EXERSICE?    You may return to normal activities such as exercising when your surgeon tells you usually 2 weeks. Walking, sitting, standing and going up the stairs are all fine after the 2-week ricardo. You may have restrictions for 1-4 weeks of no lifting, pushing, pulling in excess of 20 lbs. This is determined by what surgery you have and your surgeon.    WILL I BECOME ADDICTED TO MY PAIN MEDICATION?    Pain medications are safe and effective when used as directed. However, misuse of these products can be extremely harmful and even deadly. Pain medications must be taken only as directed by your surgeon. Do not change the dose of your pain medication without talking to your operating physician first.    POSTOPERATIVE INFORMATION: MANAGING PAIN  Measuring Your Pain    A pain scale helps you rate pain intensity. In the  scale, 0 means no pain, and 10 is the worst pain possible.  You should rate your pain every 4-6 hours. You may feel some pain even with medications. It is important to tell your health care provider if medications don't reduce the pain.        Managing Post-Op Pain at Home: Medications    Pain after an operation (post-op pain) is common and expected. These guidelines can help you stay as comfortable as possible.    Taking Pain Medications    Take any prescribed pain medications as directed by your doctor.  Take pain medications with some food to avoid an upset stomach.  Be aware that narcotic pain medications cause constipation. We recommend taking Milk of Magnesia   Eating high-fiber foods and drink plenty of water.  Don t drink alcohol while using pain medications.  Possible side effects include stomach upset, nausea, and itching.    Alternating Ibuprofen with your pain medication    Ibuprofen has three actions: it reduces fever, relieves pain and fights inflammation. Alternate between your prescribed pain medication and Ibuprofen every three hours (i.e., take a dose of Ibuprofen then three hours later take your prescribed pain medication then three hours later Ibuprofen, ect.) These two medications are safe to use together like this.    POSTOPERATIVE INFORMATION: CONSTIPATION    Constipation after surgery is a common problem and is often related to taking post-operative narcotic pain medication. Signs that you may be constipated include bloating, abdominal distention and/or pain.    Constipation Prevention & Treatment    Drink plenty of fluids! This is the most important thing you can do to help prevent constipation.  Increase physical activity as recommended by your surgeon.  Consume a high fiber diet. We recommend introducing high fiber foods pre-operatively. Some foods high in fiber include:    Oatmeal Bran  Flaxseed Dried Fruit    Carrots   Bananas Strawberries Oranges Whole Grain Bread      Bananas Prunes  Pears  Raspberries     Over the counter medication/supplements - in order of recommended treatment:   (Be sure to follow instructions on product packaging)    Fiber supplement   Bulk forming laxative - Can be used to prevent constipation  Great food sources of fiber include but are not limited to:  Colace (docusate sodium)  Osmotic stool softener - Can be used 2-3 times per day to prevent constipation  Milk of Magnesia  MIRALAX  Enema/Suppository    Patient can also  some probiotics such as Culturelle to help prevent Antibiotic associated diarrhea. They can take this 1 hour before or 2 hours after taking their antibiotic should not be taken at the same time as they can cancel out the effects.                          ** AFTER SURGERY INSTRUCTIONS **                          [] You are to remain LIMITED WEIGHT BEARING on your left foot LIMITED WEIGHT BEARING MEANS THAT IT IS ONLY OKAY FOR YOU TO APPLY LIGHT PRESSURE ON THE AFFECTED FOOT WHEN TRANSFERRING FROM YOUR ASSISTIVE DEVICE TO A CHAIR OR BED.    [] During surgery   will apply a gauze dressing to your foot. This will remain intact until you are seen in clinic for follow up    [] It is NOT OKAY to get your surgical site wet while bathing, you will need to purchase a cast cover to protect your foot from getting wet. You can purchase this at [facility] or your local pharmacy.    [] It is important that you elevate your affected foot after surgery. Proper elevation is raising your foot above your waist. The fluid in your lower extremities needs to get back to your heart so it can get pumped to your kidneys and expelled through urination. So if you notice you have to go to the bathroom more frequently when you are elevating your leg this is a good sign that it is working.     [] It is important that you increase your protein intake after surgery. Protein is essential for wound healing. We recommend you take a protein  supplement twice per day. This is in addition to your normal diet. Examples of protein supplements are Ensure, Boost, Glucerna (if you are diabetic), or protein powder. You can purchase these at your local retailer or grocery store.       Notify our office right away, if you have any changes in your health status, or if you develop a cold, flu, diarrhea, infection, fever or sore throat before your scheduled surgery date. We can be reached at [phone]   Monday-Friday 8 am - 4:30 pm if you have any questions.     Thank you for trusting us with your care!      For informational purposes only. Not to replace the advice of your health care provider. Copyright   2020 [facility]. All rights reserved. Clinically reviewed by Infection Prevention and the [facility] COVID-19 Clinical Team. [employer] 445364 - Rev 09/09/21.

## 2024-05-02 NOTE — PROGRESS NOTES
FOOT AND ANKLE SURGERY/PODIATRY CONSULT NOTE         ASSESSMENT:   Ganglion cyst left foot      TREATMENT:  I have recommended surgical excision of the large cyst left foot.  The patient stated she wanted to delay having any surgical procedures at this time.  She stated that if she decides to move forward she will contact the clinic at the appropriate time.    HPI: I was asked to see Angela Dailey today to evaluate and treat a large ganglion cyst on the dorsal lateral aspect of her left foot.  The patient indicated that she has had this mass for several years.  It does come and go periodically.  It is only mildly uncomfortable.  She only notices when she is fairly active and while wearing shoe gear.  She participates in many activities for exercise.  She does not want to have any downtime.  She denies any direct trauma to her foot.  She denies any other previous treatment.  The patient was seen in consultation at the request of Brooklyn Ryder MD for evaluation and treatment of ganglion cyst left foot.     Past Medical History:   Diagnosis Date    Ganglion cyst of left foot 4/11/2018    Menopause present 2/28/2017    Last Assessment & Plan:  Formatting of this note might be different from the original. Asymptomatic.    Other age-related cataract 1/14/2022       Social History     Socioeconomic History    Marital status:      Spouse name: Not on file    Number of children: 2    Years of education: masters in Education    Highest education level: Not on file   Occupational History    Not on file   Tobacco Use    Smoking status: Never    Smokeless tobacco: Never   Vaping Use    Vaping status: Never Used   Substance and Sexual Activity    Alcohol use: Yes     Comment: Alcoholic Drinks/day: Rarely    Drug use: No    Sexual activity: Not Currently     Partners: Male     Birth control/protection: Post-menopausal     Comment:  Flakito   Other Topics Concern    Parent/sibling w/ CABG, MI or angioplasty before  65F 55M? No   Social History Narrative    12/8/2020 she and her  are at home and she is not working. Kids are young adults - 2 girls.     3/3/2023 daughters are 27 and 24 - one is a  and the other is an occ therapist. She is exercising, biking, hiking, traveling.    4/24/2024 lives at home with  of 42 years. The younger daughter is coming back for [employer] school. And her older daughter has a new baby in [place].      Social Determinants of Health     Financial Resource Strain: Low Risk  (4/17/2024)    Financial Resource Strain     Within the past 12 months, have you or your family members you live with been unable to get utilities (heat, electricity) when it was really needed?: No   Food Insecurity: Low Risk  (4/17/2024)    Food Insecurity     Within the past 12 months, did you worry that your food would run out before you got money to buy more?: No     Within the past 12 months, did the food you bought just not last and you didn t have money to get more?: No   Transportation Needs: Low Risk  (4/17/2024)    Transportation Needs     Within the past 12 months, has lack of transportation kept you from medical appointments, getting your medicines, non-medical meetings or appointments, work, or from getting things that you need?: No   Physical Activity: Unknown (4/17/2024)    Exercise Vital Sign     Days of Exercise per Week: 6 days     Minutes of Exercise per Session: Not on file   Stress: No Stress Concern Present (4/17/2024)    [place] [employer] - Occupational Stress Questionnaire     Feeling of Stress : Only a little   Social Connections: Unknown (4/17/2024)    Social Connection and Isolation Panel [NHANES]     Frequency of Communication with Friends and Family: Not on file     Frequency of Social Gatherings with Friends and Family: Twice a week     Attends Catholic Services: Not on file     Active Member of Clubs or Organizations: Not on file     Attends Club or  Organization Meetings: Not on file     Marital Status: Not on file   Interpersonal Safety: Low Risk  (4/24/2024)    Interpersonal Safety     Do you feel physically and emotionally safe where you currently live?: Yes     Within the past 12 months, have you been hit, slapped, kicked or otherwise physically hurt by someone?: No     Within the past 12 months, have you been humiliated or emotionally abused in other ways by your partner or ex-partner?: No   Housing Stability: Low Risk  (4/24/2024)    Housing Stability     Do you have housing? : Yes     Are you worried about losing your housing?: No   Recent Concern: Housing Stability - High Risk (4/17/2024)    Housing Stability     Do you have housing? : No     Are you worried about losing your housing?: No        No Known Allergies       Current Outpatient Medications:     ascorbic acid, vitamin C, (VITAMIN C) 1000 MG tablet, [ASCORBIC ACID, VITAMIN C, (VITAMIN C) 1000 MG TABLET] Take 1,000 mg by mouth daily., Disp: , Rfl:     cholecalciferol, vitamin D3, (VITAMIN D3) 2,000 unit Tab, [CHOLECALCIFEROL, VITAMIN D3, (VITAMIN D3) 2,000 UNIT TAB] Take 2000 IU daily, Disp: , Rfl: 0    multivitamin (MULTIVITAMIN) per tablet, [MULTIVITAMIN (MULTIVITAMIN) PER TABLET] Take 1 tablet by mouth daily., Disp: , Rfl:      Family History   Problem Relation Age of Onset    Heart Disease Mother         had a congenital bicuspit aortic valve. never repaired.     Osteoporosis Mother     Osteoporotic fracture Mother     Colon Cancer Father     Cerebrovascular Disease Paternal Grandmother         Social History     Socioeconomic History    Marital status:      Spouse name: Not on file    Number of children: 2    Years of education: masters in Education    Highest education level: Not on file   Occupational History    Not on file   Tobacco Use    Smoking status: Never    Smokeless tobacco: Never   Vaping Use    Vaping status: Never Used   Substance and Sexual Activity    Alcohol use: Yes      Comment: Alcoholic Drinks/day: Rarely    Drug use: No    Sexual activity: Not Currently     Partners: Male     Birth control/protection: Post-menopausal     Comment:  Flakito   Other Topics Concern    Parent/sibling w/ CABG, MI or angioplasty before 65F 55M? No   Social History Narrative    12/8/2020 she and her  are at home and she is not working. Kids are young adults - 2 girls.     3/3/2023 daughters are 27 and 24 - one is a  and the other is an occ therapist. She is exercising, biking, hiking, traveling.    4/24/2024 lives at home with  of 42 years. The younger daughter is coming back for [employer] school. And her older daughter has a new baby in [place].      Social Determinants of Health     Financial Resource Strain: Low Risk  (4/17/2024)    Financial Resource Strain     Within the past 12 months, have you or your family members you live with been unable to get utilities (heat, electricity) when it was really needed?: No   Food Insecurity: Low Risk  (4/17/2024)    Food Insecurity     Within the past 12 months, did you worry that your food would run out before you got money to buy more?: No     Within the past 12 months, did the food you bought just not last and you didn t have money to get more?: No   Transportation Needs: Low Risk  (4/17/2024)    Transportation Needs     Within the past 12 months, has lack of transportation kept you from medical appointments, getting your medicines, non-medical meetings or appointments, work, or from getting things that you need?: No   Physical Activity: Unknown (4/17/2024)    Exercise Vital Sign     Days of Exercise per Week: 6 days     Minutes of Exercise per Session: Not on file   Stress: No Stress Concern Present (4/17/2024)    [place] [place] [employer] - Occupational Stress Questionnaire     Feeling of Stress : Only a little   Social Connections: Unknown (4/17/2024)    Social Connection and Isolation Panel [NHANES]      Frequency of Communication with Friends and Family: Not on file     Frequency of Social Gatherings with Friends and Family: Twice a week     Attends Spiritism Services: Not on file     Active Member of Clubs or Organizations: Not on file     Attends Club or Organization Meetings: Not on file     Marital Status: Not on file   Interpersonal Safety: Low Risk  (4/24/2024)    Interpersonal Safety     Do you feel physically and emotionally safe where you currently live?: Yes     Within the past 12 months, have you been hit, slapped, kicked or otherwise physically hurt by someone?: No     Within the past 12 months, have you been humiliated or emotionally abused in other ways by your partner or ex-partner?: No   Housing Stability: Low Risk  (4/24/2024)    Housing Stability     Do you have housing? : Yes     Are you worried about losing your housing?: No   Recent Concern: Housing Stability - High Risk (4/17/2024)    Housing Stability     Do you have housing? : No     Are you worried about losing your housing?: No        Review of Systems - Patient denies fever, chills, rash, wound, stiffness, limping, numbness, weakness, heart burn, blood in stool, chest pain with activity, calf pain when walking, shortness of breath with activity, chronic cough, easy bleeding/bruising, swelling of ankles, excessive thirst, fatigue, depression, anxiety.  Patient admits to cyst left foot.      OBJECTIVE:  Appearance: alert, well appearing, and in no distress.    There were no vitals taken for this visit.     There is no height or weight on file to calculate BMI.     General appearance: Patient is alert and fully cooperative with history & exam.  No sign of distress is noted during the visit.  Psychiatric: Affect is pleasant & appropriate.  Patient appears motivated to improve health.  Respiratory: Breathing is regular & unlabored while sitting.  HEENT: Hearing is intact to spoken word.  Speech is clear.  No gross evidence of visual impairment  that would impact ambulation.    Vascular: Dorsalis pedis and posterior tibial pulses are palpable. There is no pedal hair growth bilaterally.  CFT < 3 sec from anterior tibial surface to distal digits bilaterally. There is no appreciable edema noted.  Dermatologic: Turgor and texture are within normal limits. No coloration or temperature changes. No primary or secondary lesions noted.  Neurologic: All epicritic and proprioceptive sensations are grossly intact bilaterally.  Musculoskeletal: All active and passive ankle, subtalar, midtarsal, and 1st MPJ range of motion are grossly intact without pain or crepitus. Manual muscle strength is within normal limits bilaterally. All dorsiflexors, plantarflexors, invertors, evertors are intact bilaterally.  Mild tenderness present to the dorsal lateral aspect of the left foot on palpation.  No tenderness to the left foot or ankle with range of motion. Calf is soft/non-tender with/without warmth/induration    Imaging:       No images are attached to the encounter or orders placed in the encounter.     No results found.   No results found.       Mark Arroyo DPM  [facility] Foot & Ankle Surgery/Podiatry

## 2024-05-10 ENCOUNTER — DOCUMENTATION ONLY (OUTPATIENT)
Dept: OTHER | Facility: CLINIC | Age: 64
End: 2024-05-10
Payer: COMMERCIAL

## 2024-05-13 ENCOUNTER — HOSPITAL ENCOUNTER (OUTPATIENT)
Dept: BONE DENSITY | Facility: HOSPITAL | Age: 64
Discharge: HOME | End: 2024-05-13
Attending: FAMILY MEDICINE | Admitting: FAMILY MEDICINE
Payer: COMMERCIAL

## 2024-05-13 DIAGNOSIS — M81.0 AGE-RELATED OSTEOPOROSIS WITHOUT CURRENT PATHOLOGICAL FRACTURE: ICD-10-CM

## 2024-05-13 PROCEDURE — 77089 TBS DXA CAL W/I&R FX RISK: CPT

## 2024-06-13 DIAGNOSIS — M81.0 AGE-RELATED OSTEOPOROSIS WITHOUT CURRENT PATHOLOGICAL FRACTURE: Primary | ICD-10-CM

## 2025-02-19 ENCOUNTER — PATIENT OUTREACH (OUTPATIENT)
Dept: CARE COORDINATION | Facility: CLINIC | Age: 65
End: 2025-02-19
Payer: COMMERCIAL

## 2025-03-04 ENCOUNTER — OFFICE VISIT (OUTPATIENT)
Dept: ENDOCRINOLOGY | Facility: CLINIC | Age: 65
End: 2025-03-04
Attending: FAMILY MEDICINE
Payer: COMMERCIAL

## 2025-03-04 ENCOUNTER — LAB (OUTPATIENT)
Dept: LAB | Facility: CLINIC | Age: 65
End: 2025-03-04
Payer: COMMERCIAL

## 2025-03-04 VITALS
SYSTOLIC BLOOD PRESSURE: 113 MMHG | HEART RATE: 74 BPM | DIASTOLIC BLOOD PRESSURE: 73 MMHG | WEIGHT: 132.5 LBS | OXYGEN SATURATION: 99 % | BODY MASS INDEX: 20.45 KG/M2

## 2025-03-04 DIAGNOSIS — M81.0 AGE-RELATED OSTEOPOROSIS WITHOUT CURRENT PATHOLOGICAL FRACTURE: Primary | ICD-10-CM

## 2025-03-04 DIAGNOSIS — M81.0 AGE-RELATED OSTEOPOROSIS WITHOUT CURRENT PATHOLOGICAL FRACTURE: ICD-10-CM

## 2025-03-04 LAB
ALBUMIN SERPL BCG-MCNC: 4.5 G/DL (ref 3.5–5.2)
ALP SERPL-CCNC: 80 U/L (ref 40–150)
ANION GAP SERPL CALCULATED.3IONS-SCNC: 10 MMOL/L (ref 7–15)
BUN SERPL-MCNC: 12.8 MG/DL (ref 8–23)
CALCIUM SERPL-MCNC: 9.7 MG/DL (ref 8.8–10.4)
CHLORIDE SERPL-SCNC: 102 MMOL/L (ref 98–107)
CREAT SERPL-MCNC: 0.83 MG/DL (ref 0.51–0.95)
EGFRCR SERPLBLD CKD-EPI 2021: 79 ML/MIN/1.73M2
GLUCOSE SERPL-MCNC: 102 MG/DL (ref 70–99)
HCO3 SERPL-SCNC: 27 MMOL/L (ref 22–29)
PHOSPHATE SERPL-MCNC: 3.3 MG/DL (ref 2.5–4.5)
POTASSIUM SERPL-SCNC: 4 MMOL/L (ref 3.4–5.3)
PTH-INTACT SERPL-MCNC: 36 PG/ML (ref 15–65)
SODIUM SERPL-SCNC: 139 MMOL/L (ref 135–145)
TOTAL PROTEIN SERUM FOR ELP: 7.1 G/DL (ref 6.4–8.3)
TSH SERPL DL<=0.005 MIU/L-ACNC: 1.07 UIU/ML (ref 0.3–4.2)
VIT D+METAB SERPL-MCNC: 49 NG/ML (ref 20–50)

## 2025-03-04 PROCEDURE — 84165 PROTEIN E-PHORESIS SERUM: CPT | Mod: TC | Performed by: PATHOLOGY

## 2025-03-04 PROCEDURE — 3078F DIAST BP <80 MM HG: CPT | Performed by: INTERNAL MEDICINE

## 2025-03-04 PROCEDURE — 86364 TISS TRNSGLTMNASE EA IG CLAS: CPT

## 2025-03-04 PROCEDURE — 80069 RENAL FUNCTION PANEL: CPT

## 2025-03-04 PROCEDURE — 84155 ASSAY OF PROTEIN SERUM: CPT

## 2025-03-04 PROCEDURE — 99205 OFFICE O/P NEW HI 60 MIN: CPT | Performed by: INTERNAL MEDICINE

## 2025-03-04 PROCEDURE — 84165 PROTEIN E-PHORESIS SERUM: CPT | Mod: 26 | Performed by: PATHOLOGY

## 2025-03-04 PROCEDURE — G2211 COMPLEX E/M VISIT ADD ON: HCPCS | Performed by: INTERNAL MEDICINE

## 2025-03-04 PROCEDURE — 84443 ASSAY THYROID STIM HORMONE: CPT

## 2025-03-04 PROCEDURE — 84100 ASSAY OF PHOSPHORUS: CPT

## 2025-03-04 PROCEDURE — 83970 ASSAY OF PARATHORMONE: CPT

## 2025-03-04 PROCEDURE — 84075 ASSAY ALKALINE PHOSPHATASE: CPT

## 2025-03-04 PROCEDURE — 82306 VITAMIN D 25 HYDROXY: CPT

## 2025-03-04 PROCEDURE — 3074F SYST BP LT 130 MM HG: CPT | Performed by: INTERNAL MEDICINE

## 2025-03-04 PROCEDURE — 36415 COLL VENOUS BLD VENIPUNCTURE: CPT

## 2025-03-04 RX ORDER — MEPERIDINE HYDROCHLORIDE 25 MG/ML
25 INJECTION INTRAMUSCULAR; INTRAVENOUS; SUBCUTANEOUS
OUTPATIENT
Start: 2025-03-11

## 2025-03-04 RX ORDER — ACETAMINOPHEN 325 MG/1
650 TABLET ORAL
OUTPATIENT
Start: 2025-03-11

## 2025-03-04 RX ORDER — METHYLPREDNISOLONE SODIUM SUCCINATE 40 MG/ML
40 INJECTION INTRAMUSCULAR; INTRAVENOUS
Start: 2025-03-11

## 2025-03-04 RX ORDER — EPINEPHRINE 1 MG/ML
0.3 INJECTION, SOLUTION, CONCENTRATE INTRAVENOUS EVERY 5 MIN PRN
OUTPATIENT
Start: 2025-03-11

## 2025-03-04 RX ORDER — HEPARIN SODIUM,PORCINE 10 UNIT/ML
5-20 VIAL (ML) INTRAVENOUS DAILY PRN
OUTPATIENT
Start: 2025-03-11

## 2025-03-04 RX ORDER — ZOLEDRONIC ACID 0.05 MG/ML
5 INJECTION, SOLUTION INTRAVENOUS ONCE
Start: 2025-03-11

## 2025-03-04 RX ORDER — ALBUTEROL SULFATE 90 UG/1
1-2 INHALANT RESPIRATORY (INHALATION)
Start: 2025-03-11

## 2025-03-04 RX ORDER — DIPHENHYDRAMINE HYDROCHLORIDE 50 MG/ML
25 INJECTION, SOLUTION INTRAMUSCULAR; INTRAVENOUS
Start: 2025-03-11

## 2025-03-04 RX ORDER — HEPARIN SODIUM (PORCINE) LOCK FLUSH IV SOLN 100 UNIT/ML 100 UNIT/ML
5 SOLUTION INTRAVENOUS
OUTPATIENT
Start: 2025-03-11

## 2025-03-04 RX ORDER — ALBUTEROL SULFATE 0.83 MG/ML
2.5 SOLUTION RESPIRATORY (INHALATION)
OUTPATIENT
Start: 2025-03-11

## 2025-03-04 RX ORDER — DIPHENHYDRAMINE HYDROCHLORIDE 50 MG/ML
50 INJECTION, SOLUTION INTRAMUSCULAR; INTRAVENOUS
Start: 2025-03-11

## 2025-03-04 NOTE — PATIENT INSTRUCTIONS
## Osteoporosis  DXA scan images 5/2024 were reviewed independently by me today in clinic via the Radiology system, and the reported result was verified. Lowest T-score -3.2 at L1-L4    In general, a secondary cause can be identified in up to half of patients with osteoporosis or low bone density. Examples include: vitamin D deficiency (risk increased in liver/kidney disease), hypercalciuria, hypercalcemia or primary hyperparathyroidism, malabsorption, endocrine disorders, such as hyperthyroidism, Cushing s syndrome, and in males, hypogonadism, rheumatologic conditions (most commonly RA), some malignancies (such as multiple myeloma). Medications that are known to reduce bone density and/or increase fracture risk include that the patient is on: glucocorticoids, aromatase inhibitors, Depo-Provera, some antiepileptics, TZDs, and PPI s.     To evaluate for secondary causes and determine the next management steps, will check the following:  Serum calcium (with albumin) and phosphorus  25 hydroxy vitamin D level, intact PTH  BMP, Alkaline phosphatase  TSH  Other: anti-TTG (with total IgA)    Other issues in fracture prevention:  Optimize calcium 1200 mg/day and vitamin D status  Weight bearing and strengthening exercises  Fall prevention/precautions    Osteoporosis medications.  We discussed about osteoporosis (primary vs secondary), optimal calcium and vitamin D, and osteoporosis medications. We discussed about antiresorption and anabolic. Reviewed bisphosphonate (Fosamax, Reclast), Prolia. Anabolic (bone builder) Forteo, Tymlos and Evenity. We reviewed potential adverse effects of Evenity, Reclast, and Fosamax to include osteonecrosis of the jaw, atypical femoral fracture, and for Evenity- blackbox warning for MI/CVA, for Reclast-risk of flulike reaction, and for Fosamax-risk of esophagitis, etc.  We also reviewed potential adverse effects of Forteo and Tymlos in detail including injection site reaction, dizziness,  orthostasis, hypercalciuria, hypercalcemia, association with osteosarcoma in animal models, etc. Prolia, multiple spines fractures if medication was not given on schedule.    Will plan for Reclast infusion  DXA 6/2026  Follow-up 6/2026  www.bonehealthandosteoporosis.org

## 2025-03-04 NOTE — LETTER
3/4/2025      Angela Dailey  [address]  [place] [address]      Dear Colleague,    Thank you for referring your patient, Angela Dailey, to the [facility] [place]. Please see a copy of my visit note below.    Endocrinology Clinic Visit 3/3/2025    NAME:  Angela Dailey  PCP:  Brooklyn Ryder  MRN:  [id number]  Reason for Consult:  osteoporosis  Requesting Provider:  Brooklyn Ryder    Chief Complaint     Chief Complaint   Patient presents with     Consult     osteporosis       History of Present Illness     Angela Dailey is a 64 year old female who is seen in clinic for management of osteoporosis.     Leg Fx around 8 yo from fell at the playground   Mom with osteoporosis and 2 hip Fx in her 80s and wrist Fx    Past osteoporosis therapy: Fosamax around 1965-2082, [place] with significant stomach problem    Activity/weight bearing exercise/fall risk: run, walk, hike weight lifting 5days/week 30-90 mins, no fall    Dietary calcium and supplements: Calcium citrate gummy once daily for years and vitamin D 2000 international unit(s) daily for several years, [employer]  Calcium intake:  -dietary:  1 cup of yogurt/ day and orange juice fortified calcium     Pertinent Skeletal History:  /Gonadal history: Menopause around 50s    Steroid history: no    GERD or dysphagia: only from Fosamax    GI/Bariatric surgery: no    Kidney dysfunction/stone history: no    Loss of height: 1 inch shorter    Back/Joint pain: no    Dental health/Oral surgery: 3 mo ago, no issues at most recent dental visit    Habits:  Smoking history: no  Alcohol use: little, 0-1 drink/week          Problem List     Patient Active Problem List   Diagnosis     Health care maintenance     Vitamin D deficiency disease     Age-related osteoporosis without current pathological fracture     Hayfever     Rectal polyp     Ganglion cyst of left foot        Medications     Current Outpatient Medications   Medication Sig Dispense Refill      ascorbic acid, vitamin C, (VITAMIN C) 1000 MG tablet [ASCORBIC ACID, VITAMIN C, (VITAMIN C) 1000 MG TABLET] Take 1,000 mg by mouth daily.       Ca Phosphate-Cholecalciferol 250-12.5 MG-MCG CHEW Take 2 Gum by mouth daily (with breakfast).       cholecalciferol, vitamin D3, (VITAMIN D3) 2,000 unit Tab [CHOLECALCIFEROL, VITAMIN D3, (VITAMIN D3) 2,000 UNIT TAB] Take 2000 IU daily  0     multivitamin (MULTIVITAMIN) per tablet [MULTIVITAMIN (MULTIVITAMIN) PER TABLET] Take 1 tablet by mouth daily.       No current facility-administered medications for this visit.        Allergies     No Known Allergies    Medical / Surgical History     Past Medical History:   Diagnosis Date     Ganglion cyst of left foot 4/11/2018     Menopause present 2/28/2017    Last Assessment & Plan:  Formatting of this note might be different from the original. Asymptomatic.     Other age-related cataract 1/14/2022     Past Surgical History:   Procedure Laterality Date     COLONOSCOPY 2017     EYE SURGERY  2/11/22, 2/25/22    Cataract surgery       Social History     Social History     Socioeconomic History     Marital status:      Spouse name: Not on file     Number of children: 2     Years of education: masters in Education     Highest education level: Not on file   Occupational History     Not on file   Tobacco Use     Smoking status: Never     Smokeless tobacco: Never   Vaping Use     Vaping status: Never Used   Substance and Sexual Activity     Alcohol use: Yes     Comment: Alcoholic Drinks/day: Rarely     Drug use: No     Sexual activity: Not Currently     Partners: Male     Birth control/protection: Post-menopausal     Comment:  Flakito   Other Topics Concern     Parent/sibling w/ CABG, MI or angioplasty before 65F 55M? No   Social History Narrative    12/8/2020 she and her  are at home and she is not working. Kids are young adults - 2 girls.     3/3/2023 daughters are 27 and 24 - one is a  and the other is an occ  therapist. She is exercising, biking, hiking, traveling.    4/24/2024 lives at home with  of 42 years. The younger daughter is coming back for [employer]. And her older daughter has a new baby in [place].      Social Drivers of Health     Financial Resource Strain: Low Risk  (4/17/2024)    Financial Resource Strain      Within the past 12 months, have you or your family members you live with been unable to get utilities (heat, electricity) when it was really needed?: No   Food Insecurity: Low Risk  (4/17/2024)    Food Insecurity      Within the past 12 months, did you worry that your food would run out before you got money to buy more?: No      Within the past 12 months, did the food you bought just not last and you didn t have money to get more?: No   Transportation Needs: Low Risk  (4/17/2024)    Transportation Needs      Within the past 12 months, has lack of transportation kept you from medical appointments, getting your medicines, non-medical meetings or appointments, work, or from getting things that you need?: No   Physical Activity: Unknown (4/17/2024)    Exercise Vital Sign      Days of Exercise per Week: 6 days      Minutes of Exercise per Session: Not on file   Stress: No Stress Concern Present (4/17/2024)    [place] [employer]ational [employer] - Occupational Stress Questionnaire      Feeling of Stress : Only a little   Social Connections: Unknown (4/17/2024)    Social Connection and Isolation Panel [NHANES]      Frequency of Communication with Friends and Family: Not on file      Frequency of Social Gatherings with Friends and Family: Twice a week      Attends Lutheran Services: Not on file      Active Member of Clubs or Organizations: Not on file      Attends Club or Organization Meetings: Not on file      Marital Status: Not on file   Interpersonal Safety: Low Risk  (4/24/2024)    Interpersonal Safety      Do you feel physically and emotionally safe where you currently live?: Yes       Within the past 12 months, have you been hit, slapped, kicked or otherwise physically hurt by someone?: No      Within the past 12 months, have you been humiliated or emotionally abused in other ways by your partner or ex-partner?: No   Housing Stability: Low Risk  (4/24/2024)    Housing Stability      Do you have housing? : Yes      Are you worried about losing your housing?: No   Recent Concern: Housing Stability - High Risk (4/17/2024)    Housing Stability      Do you have housing? : No      Are you worried about losing your housing?: No       Family History     Family History   Problem Relation Age of Onset     Heart Disease Mother         had a congenital bicuspit aortic valve. never repaired.      Osteoporosis Mother      Osteoporotic fracture Mother      Colon Cancer Father      Cerebrovascular Disease Paternal Grandmother          Physical Exam   /73 (BP Location: Left arm)   Pulse 74   Wt 60.1 kg (132 lb 8 oz)   SpO2 99%   BMI 20.45 kg/m       General: Comfortable, no obvious distress, normal body habitus  Eyes: Sclera anicteric, moist conjunctiva  Resp:  Normal breathing  Psych: Alert and oriented x 3. Appropriate affect, good insight      Labs/Imaging       TSH   Date Value Ref Range Status   04/11/2018 0.75 0.30 - 5.00 uIU/mL Final       Creatinine   Date Value Ref Range Status   04/30/2024 0.81 0.51 - 0.95 mg/dL Final         Recent Labs   Lab Test 04/30/24  0800 03/07/23  0848    139   POTASSIUM 4.1 4.8   CHLORIDE 104 105   CO2 25 23   ANIONGAP 9 11   GLC 86 92   BUN 12.2 17.9   CR 0.81 0.75   FAYE 9.7 9.5       I personally reviewed image.    ;  Results for orders placed during the hospital encounter of 05/13/24    DX Bone Density    Narrative  EXAM: DX TBS AXIAL  LOCATION: [facility]  DATE: 5/13/2024    INDICATION: Age-related osteoporosis without current pathological fracture, , postmenopausal, family history of a hip fracture in a first-degree  relative.  DEMOGRAPHICS: Age- 63 years. Gender- Female. Menopausal status- Postmenopausal.  COMPARISON: 4/24/2018  TECHNIQUE: Dual-energy x-ray absorptiometry (DXA) performed with routine technique.  Trabecular bone score (TBS) analysis performed.    FINDINGS:    DXA RESULTS  -Lumbar Spine: L1-L4: BMD: 0.794 g/cm2. T-score: -3.2. Z-score: -1.8.  -RIGHT Hip Total: BMD: 0.745 g/cm2. T-score: -2.1. Z-score: -1.0.  -RIGHT Hip Femoral neck: BMD: 0.752 g/cm2. T-score: -2.1. Z-score: -0.7.  -LEFT Hip Total: BMD: 0.756 g/cm2. T-score: -2.0. Z-score: -0.9.  -LEFT Hip Femoral neck: BMD: 0.710 g/cm2. T-score: -2.4. Z-score: -1.0.    WHO T-SCORE CRITERIA  -Normal: T score at or above -1 SD  -Osteopenia: T score between -1 and -2.5 SD  -Osteoporosis: T score at or below -2.5 SD    The World Health Organization (WHO) criteria is applicable to perimenopausal females, postmenopausal females, and men aged 50 years or older.    TBS RESULTS  -Lumbar Spine L1-L4: TBS: 1.209. TBS T-score: -2.8.TBS Z-score: -0.9.    The TBS is a DXA derived measurement for fracture risk assessment, and reflects the structural condition of the bone microarchitecture. It can be used to adjust WHO Fracture Risk Assessment Tool (FRAX) probability of fracture in postmenopausal women and  older men. The calculated probabilities of fracture have been shown to be more accurate when computed with the TBS.    INTERVAL CHANGE  -There has been a 9.9% decrease in lumbar spine BMD.  -There has been a 6.3% decrease in bilateral hip BMD.    FRACTURE RISK  -The FRAX risk calculator is not applicable due to osteoporosis.    RECOMMENDATIONS  The patient's BMD is consistent with osteoporosis, and he/she is at increased fracture risk. If not currently being treated for low BMD, this would merit treatment according to the Bone Health and Osteoporosis Foundation.    Impression  IMPRESSION: OSTEOPOROSIS. T score meets the WHO criteria for osteoporosis at one or more measured  sites. The risk of osteoporotic fracture increases approximately two-fold for each standard deviation decrease in T-score.    No results found for this or any previous visit.          Impression / Plan     ## Osteoporosis  DXA scan images 5/2024 were reviewed independently by me today in clinic via the Radiology system, and the reported result was verified. Lowest T-score -3.2 at L1-L4    In general, a secondary cause can be identified in up to half of patients with osteoporosis or low bone density. Examples include: vitamin D deficiency (risk increased in liver/kidney disease), hypercalciuria, hypercalcemia or primary hyperparathyroidism, malabsorption, endocrine disorders, such as hyperthyroidism, Cushing s syndrome, and in males, hypogonadism, rheumatologic conditions (most commonly RA), some malignancies (such as multiple myeloma). Medications that are known to reduce bone density and/or increase fracture risk include that the patient is on: glucocorticoids, aromatase inhibitors, Depo-Provera, some antiepileptics, TZDs, and PPI s.     To evaluate for secondary causes and determine the next management steps, will check the following:  Serum calcium (with albumin) and phosphorus  25 hydroxy vitamin D level, intact PTH  BMP, Alkaline phosphatase  TSH  Other: anti-TTG (with total IgA)    Other issues in fracture prevention:  Optimize calcium 1200 mg/day and vitamin D status  Weight bearing and strengthening exercises  Fall prevention/precautions    Osteoporosis medications.  We discussed about osteoporosis (primary vs secondary), optimal calcium and vitamin D, and osteoporosis medications. We discussed about antiresorption and anabolic. Reviewed bisphosphonate (Fosamax, Reclast), Prolia. Anabolic (bone builder) Forteo, Tymlos and Evenity. We reviewed potential adverse effects of Evenity, Reclast, and Fosamax to include osteonecrosis of the jaw, atypical femoral fracture, and for Evenity- blackbox warning for MI/CVA,  for Reclast-risk of flulike reaction, and for Fosamax-risk of esophagitis, etc.  We also reviewed potential adverse effects of Forteo and Tymlos in detail including injection site reaction, dizziness, orthostasis, hypercalciuria, hypercalcemia, association with osteosarcoma in animal models, etc. Prolia, multiple spines fractures if medication was not given on schedule.    Will plan for Reclast infusion  DXA 6/2026  Follow-up 6/2026  [email or web address]      Orders Placed This Encounter   Procedures     Albumin level     Alkaline phosphatase     BASIC METABOLIC PANEL     Parathyroid Hormone Intact     Phosphorus     Vitamin D Deficiency     TSH with free T4 reflex     Tissue transglutaminase lily IgA and IgG     Protein electrophoresis         The longitudinal plan of care for the diagnosis(es)/condition(s) as documented were addressed during this visit. Due to the added complexity in care, I will continue to support Angela in the subsequent management and with ongoing continuity of care.      Cristo Watkins MD          Again, thank you for allowing me to participate in the care of your patient.        Sincerely,        Cristo Watkins MD    Electronically signed

## 2025-03-04 NOTE — PROGRESS NOTES
Endocrinology Clinic Visit 3/3/2025    NAME:  Angela Dailey  PCP:  Brooklyn Ryder  MRN:  [id number]  Reason for Consult:  osteoporosis  Requesting Provider:  Brooklyn Ryder    Chief Complaint     Chief Complaint   Patient presents with    Consult     osteporosis       History of Present Illness     Angela Dailey is a 64 year old female who is seen in clinic for management of osteoporosis.     Leg Fx around 6 yo from fell at the playground   Mom with osteoporosis and 2 hip Fx in her 80s and wrist Fx    Past osteoporosis therapy: Fosamax around 0330-7990, SE with significant stomach problem    Activity/weight bearing exercise/fall risk: run, walk, hike weight lifting 5days/week 30-90 mins, no fall    Dietary calcium and supplements: Calcium citrate gummy once daily for years and vitamin D 2000 international unit(s) daily for several years, [employer]  Calcium intake:  -dietary:  1 cup of yogurt/ day and orange juice fortified calcium     Pertinent Skeletal History:  /Gonadal history: Menopause around 50s    Steroid history: no    GERD or dysphagia: only from Fosamax    GI/Bariatric surgery: no    Kidney dysfunction/stone history: no    Loss of height: 1 inch shorter    Back/Joint pain: no    Dental health/Oral surgery: 3 mo ago, no issues at most recent dental visit    Habits:  Smoking history: no  Alcohol use: little, 0-1 drink/week          Problem List     Patient Active Problem List   Diagnosis    Health care maintenance    Vitamin D deficiency disease    Age-related osteoporosis without current pathological fracture    Hayfever    Rectal polyp    Ganglion cyst of left foot        Medications     Current Outpatient Medications   Medication Sig Dispense Refill    ascorbic acid, vitamin C, (VITAMIN C) 1000 MG tablet [ASCORBIC ACID, VITAMIN C, (VITAMIN C) 1000 MG TABLET] Take 1,000 mg by mouth daily.      Ca Phosphate-Cholecalciferol 250-12.5 MG-MCG CHEW Take 2 Gum by mouth daily (with breakfast).       cholecalciferol, vitamin D3, (VITAMIN D3) 2,000 unit Tab [CHOLECALCIFEROL, VITAMIN D3, (VITAMIN D3) 2,000 UNIT TAB] Take 2000 IU daily  0    multivitamin (MULTIVITAMIN) per tablet [MULTIVITAMIN (MULTIVITAMIN) PER TABLET] Take 1 tablet by mouth daily.       No current facility-administered medications for this visit.        Allergies     No Known Allergies    Medical / Surgical History     Past Medical History:   Diagnosis Date    Ganglion cyst of left foot 4/11/2018    Menopause present 2/28/2017    Last Assessment & Plan:  Formatting of this note might be different from the original. Asymptomatic.    Other age-related cataract 1/14/2022     Past Surgical History:   Procedure Laterality Date    COLONOSCOPY  2017    EYE SURGERY  2/11/22, 2/25/22    Cataract surgery       Social History     Social History     Socioeconomic History    Marital status:      Spouse name: Not on file    Number of children: 2    Years of education: masters in Education    Highest education level: Not on file   Occupational History    Not on file   Tobacco Use    Smoking status: Never    Smokeless tobacco: Never   Vaping Use    Vaping status: Never Used   Substance and Sexual Activity    Alcohol use: Yes     Comment: Alcoholic Drinks/day: Rarely    Drug use: No    Sexual activity: Not Currently     Partners: Male     Birth control/protection: Post-menopausal     Comment:  Flakito   Other Topics Concern    Parent/sibling w/ CABG, MI or angioplasty before 65F 55M? No   Social History Narrative    12/8/2020 she and her  are at home and she is not working. Kids are young adults - 2 girls.     3/3/2023 daughters are 27 and 24 - one is a  and the other is an occ therapist. She is exercising, biking, hiking, traveling.    4/24/2024 lives at home with  of 42 years. The younger daughter is coming back for [employer] school. And her older daughter has a new baby in [place].      Social Drivers of Health      Financial Resource Strain: Low Risk  (4/17/2024)    Financial Resource Strain     Within the past 12 months, have you or your family members you live with been unable to get utilities (heat, electricity) when it was really needed?: No   Food Insecurity: Low Risk  (4/17/2024)    Food Insecurity     Within the past 12 months, did you worry that your food would run out before you got money to buy more?: No     Within the past 12 months, did the food you bought just not last and you didn t have money to get more?: No   Transportation Needs: Low Risk  (4/17/2024)    Transportation Needs     Within the past 12 months, has lack of transportation kept you from medical appointments, getting your medicines, non-medical meetings or appointments, work, or from getting things that you need?: No   Physical Activity: Unknown (4/17/2024)    Exercise Vital Sign     Days of Exercise per Week: 6 days     Minutes of Exercise per Session: Not on file   Stress: No Stress Concern Present (4/17/2024)    [employer] - Occupational Stress Questionnaire     Feeling of Stress : Only a little   Social Connections: Unknown (4/17/2024)    Social Connection and Isolation Panel [NHANES]     Frequency of Communication with Friends and Family: Not on file     Frequency of Social Gatherings with Friends and Family: Twice a week     Attends [employer] Services: Not on file     Active Member of Clubs or Organizations: Not on file     Attends Club or Organization Meetings: Not on file     Marital Status: Not on file   Interpersonal Safety: Low Risk  (4/24/2024)    Interpersonal Safety     Do you feel physically and emotionally safe where you currently live?: Yes     Within the past 12 months, have you been hit, slapped, kicked or otherwise physically hurt by someone?: No     Within the past 12 months, have you been humiliated or emotionally abused in other ways by your partner or ex-partner?: No   Housing Stability: Low  Risk  (4/24/2024)    Housing Stability     Do you have housing? : Yes     Are you worried about losing your housing?: No   Recent Concern: Housing Stability - High Risk (4/17/2024)    Housing Stability     Do you have housing? : No     Are you worried about losing your housing?: No       Family History     Family History   Problem Relation Age of Onset    Heart Disease Mother         had a congenital bicuspit aortic valve. never repaired.     Osteoporosis Mother     Osteoporotic fracture Mother     Colon Cancer Father     Cerebrovascular Disease Paternal Grandmother          Physical Exam   /73 (BP Location: Left arm)   Pulse 74   Wt 60.1 kg (132 lb 8 oz)   SpO2 99%   BMI 20.45 kg/m       General: Comfortable, no obvious distress, normal body habitus  Eyes: Sclera anicteric, moist conjunctiva  Resp:  Normal breathing  Psych: Alert and oriented x 3. Appropriate affect, good insight      Labs/Imaging       TSH   Date Value Ref Range Status   04/11/2018 0.75 0.30 - 5.00 uIU/mL Final       Creatinine   Date Value Ref Range Status   04/30/2024 0.81 0.51 - 0.95 mg/dL Final         Recent Labs   Lab Test 04/30/24  0800 03/07/23  0848    139   POTASSIUM 4.1 4.8   CHLORIDE 104 105   CO2 25 23   ANIONGAP 9 11   GLC 86 92   BUN 12.2 17.9   CR 0.81 0.75   FAYE 9.7 9.5       I personally reviewed image.    ;  Results for orders placed during the hospital encounter of 05/13/24    DX Bone Density    Narrative  EXAM: DX TBS AXIAL  LOCATION: [facility]  DATE: 5/13/2024    INDICATION: Age-related osteoporosis without current pathological fracture, , postmenopausal, family history of a hip fracture in a first-degree relative.  DEMOGRAPHICS: Age- 63 years. Gender- Female. Menopausal status- Postmenopausal.  COMPARISON: 4/24/2018  TECHNIQUE: Dual-energy x-ray absorptiometry (DXA) performed with routine technique.  Trabecular bone score (TBS) analysis performed.    FINDINGS:    DXA  RESULTS  -Lumbar Spine: L1-L4: BMD: 0.794 g/cm2. T-score: -3.2. Z-score: -1.8.  -RIGHT Hip Total: BMD: 0.745 g/cm2. T-score: -2.1. Z-score: -1.0.  -RIGHT Hip Femoral neck: BMD: 0.752 g/cm2. T-score: -2.1. Z-score: -0.7.  -LEFT Hip Total: BMD: 0.756 g/cm2. T-score: -2.0. Z-score: -0.9.  -LEFT Hip Femoral neck: BMD: 0.710 g/cm2. T-score: -2.4. Z-score: -1.0.    WHO T-SCORE CRITERIA  -Normal: T score at or above -1 SD  -Osteopenia: T score between -1 and -2.5 SD  -Osteoporosis: T score at or below -2.5 SD    The World Health Organization (WHO) criteria is applicable to perimenopausal females, postmenopausal females, and men aged 50 years or older.    TBS RESULTS  -Lumbar Spine L1-L4: TBS: 1.209. TBS T-score: -2.8.TBS Z-score: -0.9.    The TBS is a DXA derived measurement for fracture risk assessment, and reflects the structural condition of the bone microarchitecture. It can be used to adjust WHO Fracture Risk Assessment Tool (FRAX) probability of fracture in postmenopausal women and  older men. The calculated probabilities of fracture have been shown to be more accurate when computed with the TBS.    INTERVAL CHANGE  -There has been a 9.9% decrease in lumbar spine BMD.  -There has been a 6.3% decrease in bilateral hip BMD.    FRACTURE RISK  -The FRAX risk calculator is not applicable due to osteoporosis.    RECOMMENDATIONS  The patient's BMD is consistent with osteoporosis, and he/she is at increased fracture risk. If not currently being treated for low BMD, this would merit treatment according to the Bone Health and Osteoporosis Foundation.    Impression  IMPRESSION: OSTEOPOROSIS. T score meets the WHO criteria for osteoporosis at one or more measured sites. The risk of osteoporotic fracture increases approximately two-fold for each standard deviation decrease in T-score.    No results found for this or any previous visit.          Impression / Plan     ## Osteoporosis  DXA scan images 5/2024 were reviewed  independently by me today in clinic via the Radiology system, and the reported result was verified. Lowest T-score -3.2 at L1-L4    In general, a secondary cause can be identified in up to half of patients with osteoporosis or low bone density. Examples include: vitamin D deficiency (risk increased in liver/kidney disease), hypercalciuria, hypercalcemia or primary hyperparathyroidism, malabsorption, endocrine disorders, such as hyperthyroidism, Cushing s syndrome, and in males, hypogonadism, rheumatologic conditions (most commonly RA), some malignancies (such as multiple myeloma). Medications that are known to reduce bone density and/or increase fracture risk include that the patient is on: glucocorticoids, aromatase inhibitors, Depo-Provera, some antiepileptics, TZDs, and PPI s.     To evaluate for secondary causes and determine the next management steps, will check the following:  Serum calcium (with albumin) and phosphorus  25 hydroxy vitamin D level, intact PTH  BMP, Alkaline phosphatase  TSH  Other: anti-TTG (with total IgA)    Other issues in fracture prevention:  Optimize calcium 1200 mg/day and vitamin D status  Weight bearing and strengthening exercises  Fall prevention/precautions    Osteoporosis medications.  We discussed about osteoporosis (primary vs secondary), optimal calcium and vitamin D, and osteoporosis medications. We discussed about antiresorption and anabolic. Reviewed bisphosphonate (Fosamax, Reclast), Prolia. Anabolic (bone builder) Forteo, Tymlos and Evenity. We reviewed potential adverse effects of Evenity, Reclast, and Fosamax to include osteonecrosis of the jaw, atypical femoral fracture, and for Evenity- blackbox warning for MI/CVA, for Reclast-risk of flulike reaction, and for Fosamax-risk of esophagitis, etc.  We also reviewed potential adverse effects of Forteo and Tymlos in detail including injection site reaction, dizziness, orthostasis, hypercalciuria, hypercalcemia, association  with osteosarcoma in animal models, etc. Prolia, multiple spines fractures if medication was not given on schedule.    Will plan for Reclast infusion  DXA 6/2026  Follow-up 6/2026  [email or web address]      Orders Placed This Encounter   Procedures    Albumin level    Alkaline phosphatase    BASIC METABOLIC PANEL    Parathyroid Hormone Intact    Phosphorus    Vitamin D Deficiency    TSH with free T4 reflex    Tissue transglutaminase lily IgA and IgG    Protein electrophoresis         The longitudinal plan of care for the diagnosis(es)/condition(s) as documented were addressed during this visit. Due to the added complexity in care, I will continue to support Angela in the subsequent management and with ongoing continuity of care.      Cristo Watkins MD

## 2025-03-05 LAB
ALBUMIN SERPL ELPH-MCNC: 4.4 G/DL (ref 3.7–5.1)
ALPHA1 GLOB SERPL ELPH-MCNC: 0.3 G/DL (ref 0.2–0.4)
ALPHA2 GLOB SERPL ELPH-MCNC: 0.6 G/DL (ref 0.5–0.9)
B-GLOBULIN SERPL ELPH-MCNC: 0.7 G/DL (ref 0.6–1)
GAMMA GLOB SERPL ELPH-MCNC: 1.1 G/DL (ref 0.7–1.6)
M PROTEIN SERPL ELPH-MCNC: 0 G/DL
PROT PATTERN SERPL ELPH-IMP: NORMAL

## 2025-03-05 NOTE — RESULT ENCOUNTER NOTE
Hello -     I want to let you know that your recent lab results are stable and / or within normal limits.  If you have any questions, please send A My Chart message or call  [phone].     With warm regards,  Cristo Watkins MD

## 2025-03-06 LAB
TTG IGA SER-ACNC: 0.5 U/ML
TTG IGG SER-ACNC: 0.6 U/ML

## 2025-03-19 ENCOUNTER — PATIENT OUTREACH (OUTPATIENT)
Dept: CARE COORDINATION | Facility: CLINIC | Age: 65
End: 2025-03-19
Payer: COMMERCIAL

## 2025-04-24 SDOH — HEALTH STABILITY: PHYSICAL HEALTH: ON AVERAGE, HOW MANY DAYS PER WEEK DO YOU ENGAGE IN MODERATE TO STRENUOUS EXERCISE (LIKE A BRISK WALK)?: 6 DAYS

## 2025-04-24 SDOH — HEALTH STABILITY: PHYSICAL HEALTH: ON AVERAGE, HOW MANY MINUTES DO YOU ENGAGE IN EXERCISE AT THIS LEVEL?: 50 MIN

## 2025-04-24 ASSESSMENT — SOCIAL DETERMINANTS OF HEALTH (SDOH): HOW OFTEN DO YOU GET TOGETHER WITH FRIENDS OR RELATIVES?: THREE TIMES A WEEK

## 2025-04-29 ENCOUNTER — OFFICE VISIT (OUTPATIENT)
Dept: FAMILY MEDICINE | Facility: CLINIC | Age: 65
End: 2025-04-29
Attending: FAMILY MEDICINE
Payer: COMMERCIAL

## 2025-04-29 VITALS
DIASTOLIC BLOOD PRESSURE: 76 MMHG | TEMPERATURE: 98.6 F | HEIGHT: 68 IN | HEART RATE: 67 BPM | BODY MASS INDEX: 19.85 KG/M2 | SYSTOLIC BLOOD PRESSURE: 130 MMHG | WEIGHT: 131 LBS | RESPIRATION RATE: 12 BRPM | OXYGEN SATURATION: 97 %

## 2025-04-29 DIAGNOSIS — J30.1 HAYFEVER: ICD-10-CM

## 2025-04-29 DIAGNOSIS — Z13.220 SCREENING, LIPID: ICD-10-CM

## 2025-04-29 DIAGNOSIS — M81.0 AGE-RELATED OSTEOPOROSIS WITHOUT CURRENT PATHOLOGICAL FRACTURE: ICD-10-CM

## 2025-04-29 DIAGNOSIS — Z00.00 ROUTINE GENERAL MEDICAL EXAMINATION AT A HEALTH CARE FACILITY: ICD-10-CM

## 2025-04-29 DIAGNOSIS — E55.9 VITAMIN D DEFICIENCY DISEASE: ICD-10-CM

## 2025-04-29 DIAGNOSIS — Z12.31 VISIT FOR SCREENING MAMMOGRAM: Primary | ICD-10-CM

## 2025-04-29 DIAGNOSIS — Z12.31 ENCOUNTER FOR SCREENING MAMMOGRAM FOR MALIGNANT NEOPLASM OF BREAST: ICD-10-CM

## 2025-04-29 DIAGNOSIS — Z13.0 SCREENING, ANEMIA, DEFICIENCY, IRON: ICD-10-CM

## 2025-04-29 DIAGNOSIS — Z00.00 HEALTH CARE MAINTENANCE: ICD-10-CM

## 2025-04-29 DIAGNOSIS — K62.1 RECTAL POLYP: ICD-10-CM

## 2025-04-29 DIAGNOSIS — Z91.89 CARDIOVASCULAR RISK FACTOR: ICD-10-CM

## 2025-04-29 LAB
CHOLEST SERPL-MCNC: 190 MG/DL
ERYTHROCYTE [DISTWIDTH] IN BLOOD BY AUTOMATED COUNT: 12.1 % (ref 10–15)
FASTING STATUS PATIENT QL REPORTED: YES
HCT VFR BLD AUTO: 39.1 % (ref 35–47)
HDLC SERPL-MCNC: 80 MG/DL
HGB BLD-MCNC: 13.2 G/DL (ref 11.7–15.7)
LDLC SERPL CALC-MCNC: 98 MG/DL
MCH RBC QN AUTO: 32.4 PG (ref 26.5–33)
MCHC RBC AUTO-ENTMCNC: 33.8 G/DL (ref 31.5–36.5)
MCV RBC AUTO: 96 FL (ref 78–100)
NONHDLC SERPL-MCNC: 110 MG/DL
PLATELET # BLD AUTO: 207 10E3/UL (ref 150–450)
RBC # BLD AUTO: 4.08 10E6/UL (ref 3.8–5.2)
TRIGL SERPL-MCNC: 61 MG/DL
WBC # BLD AUTO: 4.9 10E3/UL (ref 4–11)

## 2025-04-29 PROCEDURE — 80061 LIPID PANEL: CPT | Performed by: FAMILY MEDICINE

## 2025-04-29 PROCEDURE — 99214 OFFICE O/P EST MOD 30 MIN: CPT | Mod: 25 | Performed by: FAMILY MEDICINE

## 2025-04-29 PROCEDURE — 3075F SYST BP GE 130 - 139MM HG: CPT | Performed by: FAMILY MEDICINE

## 2025-04-29 PROCEDURE — 3078F DIAST BP <80 MM HG: CPT | Performed by: FAMILY MEDICINE

## 2025-04-29 PROCEDURE — 99396 PREV VISIT EST AGE 40-64: CPT | Performed by: FAMILY MEDICINE

## 2025-04-29 PROCEDURE — 36415 COLL VENOUS BLD VENIPUNCTURE: CPT | Performed by: FAMILY MEDICINE

## 2025-04-29 PROCEDURE — 85027 COMPLETE CBC AUTOMATED: CPT | Performed by: FAMILY MEDICINE

## 2025-04-29 RX ORDER — ROSUVASTATIN CALCIUM 5 MG/1
5 TABLET, COATED ORAL DAILY
Qty: 90 TABLET | Refills: 3 | Status: SHIPPED | OUTPATIENT
Start: 2025-04-29

## 2025-04-29 NOTE — PATIENT INSTRUCTIONS
Patient Education   Preventive Care Advice   This is general advice given by our system to help you stay healthy. However, your care team may have specific advice just for you. Please talk to your care team about your preventive care needs.  Nutrition  Eat 5 or more servings of fruits and vegetables each day.  Try wheat bread, brown rice and whole grain pasta (instead of white bread, rice, and pasta).  Get enough calcium and vitamin D. Check the label on foods and aim for 100% of the RDA (recommended daily allowance).  Lifestyle  Exercise at least 150 minutes each week  (30 minutes a day, 5 days a week).  Do muscle strengthening activities 2 days a week. These help control your weight and prevent disease.  No smoking.  Wear sunscreen to prevent skin cancer.  Have a dental exam and cleaning every 6 months.  Yearly exams  See your health care team every year to talk about:  Any changes in your health.  Any medicines your care team has prescribed.  Preventive care, family planning, and ways to prevent chronic diseases.  Shots (vaccines)   HPV shots (up to age 26), if you've never had them before.  Hepatitis B shots (up to age 59), if you've never had them before.  COVID-19 shot: Get this shot when it's due.  Flu shot: Get a flu shot every year.  Tetanus shot: Get a tetanus shot every 10 years.  Pneumococcal, hepatitis A, and RSV shots: Ask your care team if you need these based on your risk.  Shingles shot (for age 50 and up)  General health tests  Diabetes screening:  Starting at age 35, Get screened for diabetes at least every 3 years.  If you are younger than age 35, ask your care team if you should be screened for diabetes.  Cholesterol test: At age 39, start having a cholesterol test every 5 years, or more often if advised.  Bone density scan (DEXA): At age 50, ask your care team if you should have this scan for osteoporosis (brittle bones).  Hepatitis C: Get tested at least once in your life.  STIs (sexually  Routing to provider for further review. Ultrasound impression mentions MRI may be useful. Please advise. Unsure if ultrasound results have been reviewed. Arina Wilkinson LPN    transmitted infections)  Before age 24: Ask your care team if you should be screened for STIs.  After age 24: Get screened for STIs if you're at risk. You are at risk for STIs (including HIV) if:  You are sexually active with more than one person.  You don't use condoms every time.  You or a partner was diagnosed with a sexually transmitted infection.  If you are at risk for HIV, ask about PrEP medicine to prevent HIV.  Get tested for HIV at least once in your life, whether you are at risk for HIV or not.  Cancer screening tests  Cervical cancer screening: If you have a cervix, begin getting regular cervical cancer screening tests starting at age 21.  Breast cancer scan (mammogram): If you've ever had breasts, begin having regular mammograms starting at age 40. This is a scan to check for breast cancer.  Colon cancer screening: It is important to start screening for colon cancer at age 45.  Have a colonoscopy test every 10 years (or more often if you're at risk) Or, ask your provider about stool tests like a FIT test every year or Cologuard test every 3 years.  To learn more about your testing options, visit:   .  For help making a decision, visit:   https://bit.ly/xl28471.  Prostate cancer screening test: If you have a prostate, ask your care team if a prostate cancer screening test (PSA) at age 55 is right for you.  Lung cancer screening: If you are a current or former smoker ages 50 to 80, ask your care team if ongoing lung cancer screenings are right for you.  For informational purposes only. Not to replace the advice of your health care provider. Copyright   2023 [employer]. All rights reserved. Clinically reviewed by the [facility] Transitions Program. [employer] [id number] - REV 01/24.

## 2025-04-29 NOTE — ASSESSMENT & PLAN NOTE
Allergist -  taking allegra daily has not helped. She wants to see an allergist. She feels like the postnasal drainage is causing her to cough. She has had allergy shots in the past and they did help  Orders:    Adult Allergy/Asthma  Referral; Future

## 2025-04-29 NOTE — ASSESSMENT & PLAN NOTE
Contraception - post menopause  Recommended vaccines: -pneumo, zoster - declined. May get at pharmacy.   Pap:  normal pap w neg hpv 3/2023 - no further paps needed. All have been normal.   Mammo:  normal 3/21/2023  Colonoscopy:  nl 2017, repeat 2027   Std testing desired:  offered  Osteoporosis prevention discussed. Recommend daily calcium and vitamin d intake to keep good bone health. Recommend weight bearing exercise, no tobacco, and limit caffeine and alcohol   dexa - osteoporosis - see separate problem allie problem list.   Recommend sunscreen, exercise, & healthy diet.   Offered cbc, cmp, lipids and asked what other testing she  desires today - 3/5/2025 - bmp noted normal  - vit d normal - tsh - normal, cbc and lipids offered  Fasting: yes.  I have had an Advance Directives discussion with the patient.   Body mass index is 20.21 kg/m .   mychart active.

## 2025-04-29 NOTE — ASSESSMENT & PLAN NOTE
The 10-year ASCVD risk score (Robert CALLE, et al., 2019) is: 3.8%    Values used to calculate the score:      Age: 64 years      Sex: Female      Is Non- : No      Diabetic: No      Tobacco smoker: No      Systolic Blood Pressure: 130 mmHg      Is BP treated: No      HDL Cholesterol: 79 mg/dL      Total Cholesterol: 192 mg/dL       Statin discussed. Coronary calcium score offered but declined.   She agreed to start crestor 5mg po q day.   Follow up if side effects are noted.     Orders:    rosuvastatin (CRESTOR) 5 MG tablet; Take 1 tablet (5 mg) by mouth daily.

## 2025-04-29 NOTE — PROGRESS NOTES
Preventive Care Visit  [facility]  Brooklyn Ryder MD, Family Medicine    In addition to the preventive service, I spent 30 minutes discussing the patient's cardiovascular disease risk score and starting statin, ,discussed pros/ cons, also allergy consult placed for hay fever, she has a cough, lungs sound normal - likely postnasal drainage from allergies.      Assessment & Plan  Routine general medical examination at a health care facility         Health care maintenance  Contraception - post menopause  Recommended vaccines: -pneumo, zoster - declined. May get at pharmacy.   Pap:  normal pap w neg hpv 3/2023 - no further paps needed. All have been normal.   Mammo:  normal 3/21/2023  Colonoscopy:  nl 2017, repeat 2027   Std testing desired:  offered  Osteoporosis prevention discussed. Recommend daily calcium and vitamin d intake to keep good bone health. Recommend weight bearing exercise, no tobacco, and limit caffeine and alcohol   dexa - osteoporosis - see separate problem allie problem list.   Recommend sunscreen, exercise, & healthy diet.   Offered cbc, cmp, lipids and asked what other testing she  desires today - 3/5/2025 - bmp noted normal  - vit d normal - tsh - normal, cbc and lipids offered  Fasting: yes.  I have had an Advance Directives discussion with the patient.   Body mass index is 20.21 kg/m .   mychart active.       Cardiovascular risk factor - age over 60  The 10-year ASCVD risk score (Robert CALLE, et al., 2019) is: 3.8%    Values used to calculate the score:      Age: 64 years      Sex: Female      Is Non- : No      Diabetic: No      Tobacco smoker: No      Systolic Blood Pressure: 130 mmHg      Is BP treated: No      HDL Cholesterol: 79 mg/dL      Total Cholesterol: 192 mg/dL       Statin discussed. Coronary calcium score offered but declined.   She agreed to start crestor 5mg po q day.   Follow up if side effects are noted.     Orders:    rosuvastatin  (CRESTOR) 5 MG tablet; Take 1 tablet (5 mg) by mouth daily.    Hayfever  Allergist -  taking allegra daily has not helped. She wants to see an allergist. She feels like the postnasal drainage is causing her to cough. She has had allergy shots in the past and they did help  Orders:    Adult Allergy/Asthma  Referral; Future    Age-related osteoporosis without current pathological fracture  Osteoporosis - planning reclast through endocrinologist dr Watkins.       Vitamin D deficiency disease  Normal a month ago. No change in plan.        Rectal polyp  Plan colonoscopy 2027         Encounter for screening mammogram for malignant neoplasm of breast    Orders:    MA Screen Bilateral w/Lamine; Future    Screening, anemia, deficiency, iron    Orders:    CBC with platelets; Future    Screening, lipid    Orders:    Lipid Profile; Future    Visit for screening mammogram                Real Miller is a 64 year old, presenting for the following:  Physical        4/29/2025     8:25 AM   Additional Questions   Roomed by as   Accompanied by self         4/29/2025     8:25 AM   Patient Reported Additional Medications   Patient reports taking the following new medications no          HPI  Coughing, thinks related to allergies.     Advance Care Planning    Discussed advance care planning with patient; informed AVS has link to [employer] [employer].        4/24/2025   General Health   How would you rate your overall physical health? Excellent   Feel stress (tense, anxious, or unable to sleep) Only a little   (!) STRESS CONCERN      4/24/2025   Nutrition   Three or more servings of calcium each day? Yes   Diet: Regular (no restrictions)   How many servings of fruit and vegetables per day? 4 or more   How many sweetened beverages each day? 0-1         4/24/2025   Exercise   Days per week of moderate/strenous exercise 6 days   Average minutes spent exercising at this level 50 min         4/24/2025   Social Factors   Frequency  of gathering with friends or relatives Three times a week   Worry food won't last until get money to buy more No   Food not last or not have enough money for food? No   Do you have housing? (Housing is defined as stable permanent housing and does not include staying outside in a car, in a tent, in an abandoned building, in an overnight shelter, or couch-surfing.) Yes   Are you worried about losing your housing? No   Lack of transportation? No   Unable to get utilities (heat,electricity)? No         4/24/2025   Fall Risk   Fallen 2 or more times in the past year? No   Trouble with walking or balance? No          4/24/2025   Dental   Dentist two times every year? Yes         Today's PHQ-2 Score:       4/28/2025     9:23 AM   PHQ-2 ( 1999 [employer])   Q1: Little interest or pleasure in doing things 0   Q2: Feeling down, depressed or hopeless 0   PHQ-2 Score 0    Q1: Little interest or pleasure in doing things Not at all   Q2: Feeling down, depressed or hopeless Not at all   PHQ-2 Score 0       Patient-reported           4/24/2025   Substance Use   Alcohol more than 3/day or more than 7/wk No   Do you use any other substances recreationally? No     Social History     Tobacco Use    Smoking status: Never    Smokeless tobacco: Never   Vaping Use    Vaping status: Never Used   Substance Use Topics    Alcohol use: Yes     Comment: Alcoholic Drinks/day: Rarely    Drug use: No           4/17/2024   LAST FHS-7 RESULTS   1st degree relative breast or ovarian cancer No   Any relative bilateral breast cancer No   Any male have breast cancer No   Any ONE woman have BOTH breast AND ovarian cancer No   Any woman with breast cancer before 50yrs No   2 or more relatives with breast AND/OR ovarian cancer No   2 or more relatives with breast AND/OR bowel cancer Unknown     Mammogram Screening - Mammogram every 1-2 years updated in Health Maintenance based on mutual decision making        4/24/2025   STI Screening   New sexual partner(s)  "since last STI/HIV test? No     History of abnormal Pap smear: No - age 65 or older with adequate negative prior screening test results (3 consecutive negative cytology results, 2 consecutive negative cotesting results, or 2 consecutive negative HrHPV test results within 10 years, with the most recent test occurring within the recommended screening interval for the test used)        Latest Ref Rng & Units 3/3/2023     3:20 PM 2/28/2017     4:10 PM   PAP / HPV   PAP  Negative for Intraepithelial Lesion or Malignancy (NILM)  Negative for squamous intraepithelial lesion or malignancy  Electronically signed by Amebr Lopez CT (ASCP) on 3/7/2017 at 12:27 PM      HPV 16 DNA Negative Negative     HPV 18 DNA Negative Negative     Other HR HPV Negative Negative       ASCVD Risk   The 10-year ASCVD risk score (Robert CALLE, et al., 2019) is: 3.8%    Values used to calculate the score:      Age: 64 years      Sex: Female      Is Non- : No      Diabetic: No      Tobacco smoker: No      Systolic Blood Pressure: 130 mmHg      Is BP treated: No      HDL Cholesterol: 79 mg/dL      Total Cholesterol: 192 mg/dL      Reviewed and updated as needed this visit by Provider   Tobacco  Allergies  Meds  Problems  Med Hx  Surg Hx  Fam Hx               Objective    Exam  /76 (BP Location: Left arm, Patient Position: Left side, Cuff Size: Adult Regular)   Pulse 67   Temp 98.6  F (37  C) (Oral)   Resp 12   Ht 1.715 m (5' 7.5\")   Wt 59.4 kg (131 lb)   SpO2 97%   BMI 20.21 kg/m     Estimated body mass index is 20.21 kg/m  as calculated from the following:    Height as of this encounter: 1.715 m (5' 7.5\").    Weight as of this encounter: 59.4 kg (131 lb).    Physical Exam  Constitutional:       Appearance: Normal appearance.   HENT:      Head: Normocephalic and atraumatic.      Right Ear: Tympanic membrane, ear canal and external ear normal.      Left Ear: Tympanic membrane, ear canal and " external ear normal.      Mouth/Throat:      Mouth: Mucous membranes are moist.      Pharynx: Oropharynx is clear.   Eyes:      Extraocular Movements: Extraocular movements intact.      Conjunctiva/sclera: Conjunctivae normal.      Pupils: Pupils are equal, round, and reactive to light.   Cardiovascular:      Rate and Rhythm: Normal rate and regular rhythm.      Heart sounds: Normal heart sounds.   Pulmonary:      Effort: Pulmonary effort is normal.      Breath sounds: Normal breath sounds.   Abdominal:      General: Bowel sounds are normal.      Palpations: Abdomen is soft.   Musculoskeletal:         General: Normal range of motion.      Cervical back: Normal range of motion and neck supple.   Neurological:      General: No focal deficit present.      Mental Status: She is alert and oriented to person, place, and time.               Signed Electronically by: Brooklyn Ryder MD

## 2025-04-30 ENCOUNTER — PATIENT OUTREACH (OUTPATIENT)
Dept: CARE COORDINATION | Facility: CLINIC | Age: 65
End: 2025-04-30
Payer: COMMERCIAL

## 2025-05-07 ENCOUNTER — ANCILLARY PROCEDURE (OUTPATIENT)
Dept: MAMMOGRAPHY | Facility: CLINIC | Age: 65
End: 2025-05-07
Attending: FAMILY MEDICINE
Payer: COMMERCIAL

## 2025-05-07 DIAGNOSIS — Z12.31 ENCOUNTER FOR SCREENING MAMMOGRAM FOR MALIGNANT NEOPLASM OF BREAST: ICD-10-CM

## 2025-05-07 PROCEDURE — 77063 BREAST TOMOSYNTHESIS BI: CPT

## 2025-06-02 DIAGNOSIS — M81.0 AGE-RELATED OSTEOPOROSIS WITHOUT CURRENT PATHOLOGICAL FRACTURE: Primary | ICD-10-CM

## 2025-07-14 ENCOUNTER — PATIENT OUTREACH (OUTPATIENT)
Dept: CARE COORDINATION | Facility: CLINIC | Age: 65
End: 2025-07-14
Payer: COMMERCIAL

## 2025-07-15 ENCOUNTER — PATIENT OUTREACH (OUTPATIENT)
Dept: CARE COORDINATION | Facility: CLINIC | Age: 65
End: 2025-07-15
Payer: COMMERCIAL

## 2025-07-16 DIAGNOSIS — R05.3 CHRONIC COUGH: Primary | ICD-10-CM

## 2025-07-17 ENCOUNTER — ANCILLARY PROCEDURE (OUTPATIENT)
Dept: GENERAL RADIOLOGY | Facility: CLINIC | Age: 65
End: 2025-07-17
Payer: COMMERCIAL

## 2025-07-17 DIAGNOSIS — R05.3 CHRONIC COUGH: ICD-10-CM

## 2025-07-17 PROCEDURE — 71046 X-RAY EXAM CHEST 2 VIEWS: CPT | Mod: TC | Performed by: RADIOLOGY

## 2025-07-22 ENCOUNTER — OFFICE VISIT (OUTPATIENT)
Dept: FAMILY MEDICINE | Facility: CLINIC | Age: 65
End: 2025-07-22
Payer: COMMERCIAL

## 2025-07-22 VITALS
HEART RATE: 71 BPM | DIASTOLIC BLOOD PRESSURE: 70 MMHG | SYSTOLIC BLOOD PRESSURE: 130 MMHG | BODY MASS INDEX: 20.31 KG/M2 | HEIGHT: 68 IN | RESPIRATION RATE: 12 BRPM | WEIGHT: 134 LBS | TEMPERATURE: 98.4 F | OXYGEN SATURATION: 97 %

## 2025-07-22 DIAGNOSIS — M81.0 AGE-RELATED OSTEOPOROSIS WITHOUT CURRENT PATHOLOGICAL FRACTURE: ICD-10-CM

## 2025-07-22 DIAGNOSIS — J44.9 CHRONIC OBSTRUCTIVE PULMONARY DISEASE, UNSPECIFIED COPD TYPE (H): Primary | ICD-10-CM

## 2025-07-22 DIAGNOSIS — R05.3 CHRONIC COUGH: ICD-10-CM

## 2025-07-22 PROCEDURE — 99214 OFFICE O/P EST MOD 30 MIN: CPT | Performed by: FAMILY MEDICINE

## 2025-07-22 PROCEDURE — 3078F DIAST BP <80 MM HG: CPT | Performed by: FAMILY MEDICINE

## 2025-07-22 PROCEDURE — 3075F SYST BP GE 130 - 139MM HG: CPT | Performed by: FAMILY MEDICINE

## 2025-07-22 RX ORDER — ALENDRONATE SODIUM 70 MG/1
70 TABLET ORAL
Qty: 12 TABLET | Refills: 3 | Status: SHIPPED | OUTPATIENT
Start: 2025-07-22

## 2025-07-22 NOTE — ASSESSMENT & PLAN NOTE
Chronic cough - saw allergy, and they recommended nasal spray and ent referral.   Ent said they wouldn't see her with out a cxr.   Cxr showed copd and although she has no exertional dyspnea a referral to pulm med is given.     Still this doesn't address her post nasal drainage that she says she chronically has so she does still also plan to see ent.     Await ent opinion and pulm opinion.

## 2025-07-22 NOTE — PROGRESS NOTES
Assessment & Plan  Chronic obstructive pulmonary disease, unspecified COPD type (H)  Evidence of COPD seen on CXR. Patient also has chronic cough, but no dyspnea on exertion.   Will refer to pulmonologist for assessment and treatment.     Orders:    Adult Pulmonary Medicine  Referral; Future    Age-related osteoporosis without current pathological fracture  Message sent to yimi:   Jacob ChinAngela came to see me today. Said she doesn't want to do reclast (that is what you had discussed with her) but prefers fosamax which gave her some nausea, but she says it is tolerable and she prefers to stick with that. So I did prescribe it and wanted to make sure you have no concerns.   Brooklyn Ryder MD     Restart fosamax. Will plan to continue for 3 years as patient already did use for 2 years prior to now.     Orders:    alendronate (FOSAMAX) 70 MG tablet; Take 1 tablet (70 mg) by mouth every 7 days.    Chronic cough  Chronic cough - saw allergy, and they recommended nasal spray and ent referral.   Ent said they wouldn't see her with out a cxr.   Cxr showed copd and although she has no exertional dyspnea a referral to pulm med is given.     Still this doesn't address her post nasal drainage that she says she chronically has so she does still also plan to see ent.     Await ent opinion and pulm opinion.             Subjective   Angela is a 65 year old, presenting for the following health issues:  Discuss xray results and Osteoporosis treatement        7/22/2025    10:40 AM   Additional Questions   Roomed by as   Accompanied by self         7/22/2025    10:40 AM   Patient Reported Additional Medications   Patient reports taking the following new medications no     History of Present Illness       COPD:  She presents for follow up of COPD.  Overall, COPD symptoms are stable since last visit.  She has no fatigue or shortness of breath with exertion and no shortness of breath at rest.  She often coughs and  "does not have change in sputum. No recent fever. She can walk greater than 2 miles without stopping to rest. She can walk 3 or more flights of stairs without resting. The patient has had no ED, urgent care, or hospital admissions because of COPD since the last visit.     She eats 4 or more servings of fruits and vegetables daily.She consumes 2 sweetened beverage(s) daily.She exercises with enough effort to increase her heart rate 30 to 60 minutes per day.  She exercises with enough effort to increase her heart rate 5 days per week.   She is taking medications regularly.              Objective    /70 (BP Location: Left arm, Patient Position: Left side, Cuff Size: Adult Regular)   Pulse 71   Temp 98.4  F (36.9  C) (Oral)   Resp 12   Ht 1.715 m (5' 7.5\")   Wt 60.8 kg (134 lb)   SpO2 97%   BMI 20.68 kg/m    Body mass index is 20.68 kg/m .  Physical Exam  Constitutional:       Appearance: Normal appearance.   HENT:      Head: Normocephalic and atraumatic.   Cardiovascular:      Rate and Rhythm: Normal rate and regular rhythm.   Pulmonary:      Effort: Pulmonary effort is normal.   Musculoskeletal:         General: Normal range of motion.      Cervical back: Normal range of motion and neck supple.   Neurological:      General: No focal deficit present.      Mental Status: She is alert and oriented to person, place, and time.            Signed Electronically by: Brooklyn Ryder MD    "

## 2025-07-22 NOTE — ASSESSMENT & PLAN NOTE
Evidence of COPD seen on CXR. Patient also has chronic cough, but no dyspnea on exertion.   Will refer to pulmonologist for assessment and treatment.     Orders:    Adult Pulmonary Medicine  Referral; Future

## 2025-07-22 NOTE — Clinical Note
Angela Penn Dr. came to see me today. Said she doesn't want to do reclast (that is what you had discussed with her) but prefers fosamax which gave her some nausea, but she says it is tolerable and she prefers to stick with that. So I did prescribe it and wanted to make sure you have no concerns.  Brooklyn Ryder MD

## 2025-07-22 NOTE — ASSESSMENT & PLAN NOTE
Message sent to yimi:   Angela Penn Dr. came to see me today. Said she doesn't want to do reclast (that is what you had discussed with her) but prefers fosamax which gave her some nausea, but she says it is tolerable and she prefers to stick with that. So I did prescribe it and wanted to make sure you have no concerns.   Brooklyn Ryder MD     Restart fosamax. Will plan to continue for 3 years as patient already did use for 2 years prior to now.     Orders:    alendronate (FOSAMAX) 70 MG tablet; Take 1 tablet (70 mg) by mouth every 7 days.

## 2025-07-23 ENCOUNTER — TELEPHONE (OUTPATIENT)
Dept: PULMONOLOGY | Facility: CLINIC | Age: 65
End: 2025-07-23
Payer: COMMERCIAL

## 2025-07-23 DIAGNOSIS — J44.9 COPD (CHRONIC OBSTRUCTIVE PULMONARY DISEASE) (H): Primary | ICD-10-CM

## 2025-07-23 NOTE — TELEPHONE ENCOUNTER
[facility] Call Center    Phone Message    May a detailed message be left on voicemail: yes     Reason for Call: Appointment Intake    Referring Provider Name: Dr. Brooklyn Ryder  Diagnosis and/or Symptoms: COPD    Pt is scheduled to establish care 9/5/25 with Dr. Mckee, needs orders placed for PFT's (pended).    Action Taken: Other: Pulm    Travel Screening: Not Applicable